# Patient Record
Sex: MALE | Race: BLACK OR AFRICAN AMERICAN | Employment: UNEMPLOYED | URBAN - METROPOLITAN AREA
[De-identification: names, ages, dates, MRNs, and addresses within clinical notes are randomized per-mention and may not be internally consistent; named-entity substitution may affect disease eponyms.]

---

## 2017-04-18 ENCOUNTER — HOSPITAL ENCOUNTER (OUTPATIENT)
Dept: LAB | Age: 82
Discharge: HOME | End: 2017-04-18

## 2017-04-18 PROCEDURE — 99001 SPECIMEN HANDLING PT-LAB: CPT | Performed by: INTERNAL MEDICINE

## 2017-11-22 ENCOUNTER — HOSPITAL ENCOUNTER (OUTPATIENT)
Dept: LAB | Age: 82
Discharge: HOME | End: 2017-11-22

## 2017-11-22 PROCEDURE — 99001 SPECIMEN HANDLING PT-LAB: CPT | Performed by: INTERNAL MEDICINE

## 2018-05-09 ENCOUNTER — HOSPITAL ENCOUNTER (OUTPATIENT)
Dept: LAB | Age: 83
Discharge: HOME | End: 2018-05-09
Payer: MEDICARE

## 2018-05-09 DIAGNOSIS — M17.0 PRIMARY OSTEOARTHRITIS OF BOTH KNEES: ICD-10-CM

## 2018-05-09 DIAGNOSIS — E11.29 TYPE II OR UNSPECIFIED TYPE DIABETES MELLITUS WITH RENAL MANIFESTATIONS, UNCONTROLLED(250.42) (HCC): ICD-10-CM

## 2018-05-09 DIAGNOSIS — E11.65 TYPE II OR UNSPECIFIED TYPE DIABETES MELLITUS WITH RENAL MANIFESTATIONS, UNCONTROLLED(250.42) (HCC): ICD-10-CM

## 2018-05-09 DIAGNOSIS — R97.20 ELEVATED PROSTATE SPECIFIC ANTIGEN (PSA): ICD-10-CM

## 2018-05-09 DIAGNOSIS — I10 HYPERTENSION, ESSENTIAL: ICD-10-CM

## 2018-05-09 DIAGNOSIS — M10.9 GOUT, UNSPECIFIED: ICD-10-CM

## 2018-05-09 DIAGNOSIS — E78.00 PURE HYPERCHOLESTEROLEMIA: ICD-10-CM

## 2018-05-09 LAB
ALBUMIN SERPL-MCNC: 3.8 G/DL (ref 3.4–5)
ALBUMIN/GLOB SERPL: 1 {RATIO} (ref 0.8–1.7)
ALP SERPL-CCNC: 77 U/L (ref 45–117)
ALT SERPL-CCNC: 26 U/L (ref 16–61)
ANION GAP SERPL CALC-SCNC: 5 MMOL/L (ref 3–18)
AST SERPL-CCNC: 14 U/L (ref 15–37)
BASOPHILS # BLD: 0 K/UL (ref 0–0.06)
BASOPHILS NFR BLD: 0 % (ref 0–2)
BILIRUB SERPL-MCNC: 0.4 MG/DL (ref 0.2–1)
BUN SERPL-MCNC: 19 MG/DL (ref 7–18)
BUN/CREAT SERPL: 17 (ref 12–20)
CALCIUM SERPL-MCNC: 8.5 MG/DL (ref 8.5–10.1)
CHLORIDE SERPL-SCNC: 109 MMOL/L (ref 100–108)
CHOLEST SERPL-MCNC: 131 MG/DL
CO2 SERPL-SCNC: 27 MMOL/L (ref 21–32)
CREAT SERPL-MCNC: 1.12 MG/DL (ref 0.6–1.3)
CREAT UR-MCNC: 19.4 MG/DL (ref 30–125)
DIFFERENTIAL METHOD BLD: ABNORMAL
EOSINOPHIL # BLD: 0.1 K/UL (ref 0–0.4)
EOSINOPHIL NFR BLD: 2 % (ref 0–5)
ERYTHROCYTE [DISTWIDTH] IN BLOOD BY AUTOMATED COUNT: 13.7 % (ref 11.6–14.5)
GLOBULIN SER CALC-MCNC: 3.9 G/DL (ref 2–4)
GLUCOSE SERPL-MCNC: 138 MG/DL (ref 74–99)
HBA1C MFR BLD: 6.5 % (ref 4.2–5.6)
HCT VFR BLD AUTO: 36.8 % (ref 36–48)
HDLC SERPL-MCNC: 41 MG/DL (ref 40–60)
HDLC SERPL: 3.2 {RATIO} (ref 0–5)
HGB BLD-MCNC: 12.2 G/DL (ref 13–16)
LDLC SERPL CALC-MCNC: 68.8 MG/DL (ref 0–100)
LIPID PROFILE,FLP: NORMAL
LYMPHOCYTES # BLD: 1.9 K/UL (ref 0.9–3.6)
LYMPHOCYTES NFR BLD: 32 % (ref 21–52)
MCH RBC QN AUTO: 27.2 PG (ref 24–34)
MCHC RBC AUTO-ENTMCNC: 33.2 G/DL (ref 31–37)
MCV RBC AUTO: 82 FL (ref 74–97)
MICROALBUMIN UR-MCNC: <0.5 MG/DL (ref 0–3)
MICROALBUMIN/CREAT UR-RTO: ABNORMAL MG/G (ref 0–30)
MONOCYTES # BLD: 0.3 K/UL (ref 0.05–1.2)
MONOCYTES NFR BLD: 6 % (ref 3–10)
NEUTS SEG # BLD: 3.6 K/UL (ref 1.8–8)
NEUTS SEG NFR BLD: 60 % (ref 40–73)
PLATELET # BLD AUTO: 175 K/UL (ref 135–420)
PMV BLD AUTO: 11.8 FL (ref 9.2–11.8)
POTASSIUM SERPL-SCNC: 3.5 MMOL/L (ref 3.5–5.5)
PROT SERPL-MCNC: 7.7 G/DL (ref 6.4–8.2)
RBC # BLD AUTO: 4.49 M/UL (ref 4.7–5.5)
SODIUM SERPL-SCNC: 141 MMOL/L (ref 136–145)
T4 SERPL-MCNC: 8.5 UG/DL (ref 4.7–13.3)
TRIGL SERPL-MCNC: 106 MG/DL (ref ?–150)
TSH SERPL DL<=0.05 MIU/L-ACNC: 1.41 UIU/ML (ref 0.36–3.74)
VLDLC SERPL CALC-MCNC: 21.2 MG/DL
WBC # BLD AUTO: 6 K/UL (ref 4.6–13.2)

## 2018-05-09 PROCEDURE — 84443 ASSAY THYROID STIM HORMONE: CPT | Performed by: INTERNAL MEDICINE

## 2018-05-09 PROCEDURE — 83036 HEMOGLOBIN GLYCOSYLATED A1C: CPT | Performed by: INTERNAL MEDICINE

## 2018-05-09 PROCEDURE — 84436 ASSAY OF TOTAL THYROXINE: CPT | Performed by: INTERNAL MEDICINE

## 2018-05-09 PROCEDURE — 80053 COMPREHEN METABOLIC PANEL: CPT | Performed by: INTERNAL MEDICINE

## 2018-05-09 PROCEDURE — 80061 LIPID PANEL: CPT | Performed by: INTERNAL MEDICINE

## 2018-05-09 PROCEDURE — 85025 COMPLETE CBC W/AUTO DIFF WBC: CPT | Performed by: INTERNAL MEDICINE

## 2018-05-09 PROCEDURE — 82043 UR ALBUMIN QUANTITATIVE: CPT | Performed by: INTERNAL MEDICINE

## 2018-05-09 PROCEDURE — 36415 COLL VENOUS BLD VENIPUNCTURE: CPT | Performed by: INTERNAL MEDICINE

## 2019-02-14 ENCOUNTER — HOSPITAL ENCOUNTER (INPATIENT)
Age: 84
LOS: 6 days | Discharge: HOME HEALTH | DRG: 696 | End: 2019-02-20
Attending: EMERGENCY MEDICINE | Admitting: INTERNAL MEDICINE
Payer: MEDICARE

## 2019-02-14 DIAGNOSIS — R31.9 HEMATURIA, UNSPECIFIED TYPE: Primary | ICD-10-CM

## 2019-02-14 DIAGNOSIS — R42 LIGHTHEADEDNESS: ICD-10-CM

## 2019-02-14 LAB
ALBUMIN SERPL-MCNC: 4 G/DL (ref 3.4–5)
ALBUMIN/GLOB SERPL: 1 {RATIO} (ref 0.8–1.7)
ALP SERPL-CCNC: 90 U/L (ref 45–117)
ALT SERPL-CCNC: 22 U/L (ref 16–61)
ANION GAP SERPL CALC-SCNC: 9 MMOL/L (ref 3–18)
APPEARANCE UR: ABNORMAL
AST SERPL-CCNC: 18 U/L (ref 15–37)
BACTERIA URNS QL MICRO: ABNORMAL /HPF
BASOPHILS # BLD: 0 K/UL (ref 0–0.06)
BASOPHILS NFR BLD: 0 % (ref 0–3)
BILIRUB SERPL-MCNC: 0.4 MG/DL (ref 0.2–1)
BILIRUB UR QL: ABNORMAL
BUN SERPL-MCNC: 21 MG/DL (ref 7–18)
BUN/CREAT SERPL: 16 (ref 12–20)
CALCIUM SERPL-MCNC: 8.4 MG/DL (ref 8.5–10.1)
CHLORIDE SERPL-SCNC: 103 MMOL/L (ref 100–108)
CK MB CFR SERPL CALC: 2.4 % (ref 0–4)
CK MB SERPL-MCNC: 2.7 NG/ML (ref 5–25)
CK SERPL-CCNC: 111 U/L (ref 39–308)
CO2 SERPL-SCNC: 24 MMOL/L (ref 21–32)
COLOR UR: ABNORMAL
CREAT SERPL-MCNC: 1.35 MG/DL (ref 0.6–1.3)
DIFFERENTIAL METHOD BLD: ABNORMAL
EOSINOPHIL # BLD: 0 K/UL (ref 0–0.4)
EOSINOPHIL NFR BLD: 0 % (ref 0–5)
EPITH CASTS URNS QL MICRO: ABNORMAL /LPF (ref 0–5)
ERYTHROCYTE [DISTWIDTH] IN BLOOD BY AUTOMATED COUNT: 12.7 % (ref 11.6–14.5)
GLOBULIN SER CALC-MCNC: 4 G/DL (ref 2–4)
GLUCOSE BLD STRIP.AUTO-MCNC: 134 MG/DL (ref 70–110)
GLUCOSE SERPL-MCNC: 191 MG/DL (ref 74–99)
GLUCOSE UR STRIP.AUTO-MCNC: 100 MG/DL
HCT VFR BLD AUTO: 35 % (ref 36–48)
HGB BLD-MCNC: 11.5 G/DL (ref 13–16)
HGB UR QL STRIP: ABNORMAL
INR PPP: 1 (ref 0.8–1.2)
KETONES UR QL STRIP.AUTO: 40 MG/DL
LACTATE BLD-SCNC: 1.31 MMOL/L (ref 0.4–2)
LEUKOCYTE ESTERASE UR QL STRIP.AUTO: ABNORMAL
LYMPHOCYTES # BLD: 1.5 K/UL (ref 0.8–3.5)
LYMPHOCYTES NFR BLD: 14 % (ref 20–51)
MCH RBC QN AUTO: 27.6 PG (ref 24–34)
MCHC RBC AUTO-ENTMCNC: 32.9 G/DL (ref 31–37)
MCV RBC AUTO: 84.1 FL (ref 74–97)
MONOCYTES # BLD: 0.4 K/UL (ref 0–1)
MONOCYTES NFR BLD: 4 % (ref 2–9)
NEUTS SEG # BLD: 8.6 K/UL (ref 1.8–8)
NEUTS SEG NFR BLD: 82 % (ref 42–75)
NITRITE UR QL STRIP.AUTO: NEGATIVE
OTHER,OTHU: ABNORMAL
PH UR STRIP: 8.5 [PH] (ref 5–8)
PLATELET # BLD AUTO: 181 K/UL (ref 135–420)
PLATELET COMMENTS,PCOM: ABNORMAL
PMV BLD AUTO: 10.9 FL (ref 9.2–11.8)
POTASSIUM SERPL-SCNC: 4.1 MMOL/L (ref 3.5–5.5)
PROT SERPL-MCNC: 8 G/DL (ref 6.4–8.2)
PROT UR STRIP-MCNC: >300 MG/DL
PROTHROMBIN TIME: 13.2 SEC (ref 11.5–15.2)
RBC # BLD AUTO: 4.16 M/UL (ref 4.7–5.5)
RBC #/AREA URNS HPF: ABNORMAL /HPF (ref 0–5)
RBC MORPH BLD: ABNORMAL
SODIUM SERPL-SCNC: 136 MMOL/L (ref 136–145)
SP GR UR REFRACTOMETRY: 1.01 (ref 1–1.03)
TROPONIN I BLD-MCNC: <0.04 NG/ML (ref 0–0.08)
TROPONIN I SERPL-MCNC: <0.02 NG/ML (ref 0–0.04)
UROBILINOGEN UR QL STRIP.AUTO: 4 EU/DL (ref 0.2–1)
WBC # BLD AUTO: 10.5 K/UL (ref 4.6–13.2)
WBC URNS QL MICRO: ABNORMAL /HPF (ref 0–4)

## 2019-02-14 PROCEDURE — 87086 URINE CULTURE/COLONY COUNT: CPT

## 2019-02-14 PROCEDURE — 96360 HYDRATION IV INFUSION INIT: CPT

## 2019-02-14 PROCEDURE — 85610 PROTHROMBIN TIME: CPT

## 2019-02-14 PROCEDURE — 82550 ASSAY OF CK (CPK): CPT

## 2019-02-14 PROCEDURE — 74011000250 HC RX REV CODE- 250: Performed by: INTERNAL MEDICINE

## 2019-02-14 PROCEDURE — 65660000000 HC RM CCU STEPDOWN

## 2019-02-14 PROCEDURE — 74011250636 HC RX REV CODE- 250/636: Performed by: EMERGENCY MEDICINE

## 2019-02-14 PROCEDURE — 93005 ELECTROCARDIOGRAM TRACING: CPT

## 2019-02-14 PROCEDURE — 99285 EMERGENCY DEPT VISIT HI MDM: CPT

## 2019-02-14 PROCEDURE — 83605 ASSAY OF LACTIC ACID: CPT

## 2019-02-14 PROCEDURE — 84484 ASSAY OF TROPONIN QUANT: CPT

## 2019-02-14 PROCEDURE — 81001 URINALYSIS AUTO W/SCOPE: CPT

## 2019-02-14 PROCEDURE — 74011250636 HC RX REV CODE- 250/636: Performed by: INTERNAL MEDICINE

## 2019-02-14 PROCEDURE — 85025 COMPLETE CBC W/AUTO DIFF WBC: CPT

## 2019-02-14 PROCEDURE — 87040 BLOOD CULTURE FOR BACTERIA: CPT

## 2019-02-14 PROCEDURE — 80053 COMPREHEN METABOLIC PANEL: CPT

## 2019-02-14 PROCEDURE — 82962 GLUCOSE BLOOD TEST: CPT

## 2019-02-14 RX ORDER — HYDRALAZINE HYDROCHLORIDE 20 MG/ML
10 INJECTION INTRAMUSCULAR; INTRAVENOUS
Status: DISCONTINUED | OUTPATIENT
Start: 2019-02-14 | End: 2019-02-20 | Stop reason: HOSPADM

## 2019-02-14 RX ORDER — ONDANSETRON 2 MG/ML
4 INJECTION INTRAMUSCULAR; INTRAVENOUS
Status: DISCONTINUED | OUTPATIENT
Start: 2019-02-14 | End: 2019-02-20 | Stop reason: HOSPADM

## 2019-02-14 RX ORDER — CEPHALEXIN 500 MG/1
500 CAPSULE ORAL 2 TIMES DAILY
Qty: 14 CAP | Refills: 0 | Status: SHIPPED | OUTPATIENT
Start: 2019-02-14 | End: 2019-02-20

## 2019-02-14 RX ORDER — TAMSULOSIN HYDROCHLORIDE 0.4 MG/1
0.4 CAPSULE ORAL DAILY
Status: DISCONTINUED | OUTPATIENT
Start: 2019-02-15 | End: 2019-02-15

## 2019-02-14 RX ORDER — INSULIN LISPRO 100 [IU]/ML
INJECTION, SOLUTION INTRAVENOUS; SUBCUTANEOUS
Status: DISCONTINUED | OUTPATIENT
Start: 2019-02-14 | End: 2019-02-20 | Stop reason: HOSPADM

## 2019-02-14 RX ORDER — DUTASTERIDE 0.5 MG/1
0.5 CAPSULE, LIQUID FILLED ORAL DAILY
Status: DISCONTINUED | OUTPATIENT
Start: 2019-02-15 | End: 2019-02-20 | Stop reason: HOSPADM

## 2019-02-14 RX ORDER — SODIUM CHLORIDE 9 MG/ML
75 INJECTION, SOLUTION INTRAVENOUS CONTINUOUS
Status: DISCONTINUED | OUTPATIENT
Start: 2019-02-14 | End: 2019-02-15

## 2019-02-14 RX ORDER — ACETAMINOPHEN 325 MG/1
650 TABLET ORAL
Status: DISCONTINUED | OUTPATIENT
Start: 2019-02-14 | End: 2019-02-20 | Stop reason: HOSPADM

## 2019-02-14 RX ORDER — DUTASTERIDE 0.5 MG/1
0.5 CAPSULE, LIQUID FILLED ORAL DAILY
Qty: 30 CAP | Refills: 0 | Status: SHIPPED | OUTPATIENT
Start: 2019-02-14 | End: 2019-02-20

## 2019-02-14 RX ORDER — AMLODIPINE BESYLATE 5 MG/1
5 TABLET ORAL DAILY
Status: DISCONTINUED | OUTPATIENT
Start: 2019-02-15 | End: 2019-02-15

## 2019-02-14 RX ORDER — MAGNESIUM SULFATE 100 %
16 CRYSTALS MISCELLANEOUS AS NEEDED
Status: DISCONTINUED | OUTPATIENT
Start: 2019-02-14 | End: 2019-02-20 | Stop reason: HOSPADM

## 2019-02-14 RX ORDER — DEXTROSE 50 % IN WATER (D50W) INTRAVENOUS SYRINGE
25-50 AS NEEDED
Status: DISCONTINUED | OUTPATIENT
Start: 2019-02-14 | End: 2019-02-20 | Stop reason: HOSPADM

## 2019-02-14 RX ADMIN — WATER 1 G: 1 INJECTION INTRAMUSCULAR; INTRAVENOUS; SUBCUTANEOUS at 16:46

## 2019-02-14 RX ADMIN — SODIUM CHLORIDE 1000 ML: 900 INJECTION, SOLUTION INTRAVENOUS at 14:04

## 2019-02-14 RX ADMIN — SODIUM CHLORIDE 75 ML/HR: 900 INJECTION, SOLUTION INTRAVENOUS at 21:23

## 2019-02-14 NOTE — ED TRIAGE NOTES
1135: EMS reports pt sent from PCP for rectal bleeding; approximately 500 ml lost;feeling dizziness; 's; denies abd pain

## 2019-02-14 NOTE — H&P
History and Physical      NAME:  Yunier Balbuena   :   1935   MRN:   [id number]     Date/Time:  2019     Chief Complaint: hematuria       History of Present Illness:        Mr. Vasyl Corona is a 80 y.o.   male with a PMH of HTN, hyperlipidemia, Gout and BPH who presents with c/c of hematuria. Patient has sinai hematuria and went to see his PCP today who send him to ED for evaluation. He continue to have hematuria here in ED. He denies fever or chills. No abdominal or pelvic pain. No nausea or vomiting. Here in ED his H/H was 11.5/35. UA is +ve for UTI, started on antibiotics, urology also is consulted and admitted for further evaluation and management. Past Medical History:   Diagnosis Date    Arthropathy, unspecified, other specified sites     Bone pain     BPH (benign prostatic hypertrophy)     BPH with obstruction/lower urinary tract symptoms     Cough     Fracture     right distal humerus     Gout     H/O seasonal allergies     Hypercholesterolemia     Hypertension     Obesity     Pain with swallowing     Retention of urine, unspecified     Right arm pain     SOB (shortness of breath)     Tattoo     Urinary retention         Past Surgical History:   Procedure Laterality Date    COLONOSCOPY N/A 2016    COLONOSCOPY performed by Kristen Chow MD at [address] HX ORTHOPAEDIC  2010    ORIF, right distal humerus fracture    HX OTHER SURGICAL      CYSTOSCOPY W PHOTOVAPORIZATION POST PROCEDURE ORDERSET 1    FL COLSC FLX W/NDSC US XM RCTM ET AL LMTD&ADJ STRUX         Social History     Tobacco Use    Smoking status: Former Smoker    Smokeless tobacco: Former User   Substance Use Topics    Alcohol use: Yes     Comment: occasionally        History reviewed. No pertinent family history. No Known Allergies     Prior to Admission medications    Medication Sig Start Date End Date Taking?  Authorizing Provider   dutasteride (AVODART) 0.5 mg capsule Take 1 Cap by mouth daily for 30 days. 2/14/19 3/16/19 Yes Candi Montana MD   cephALEXin [facility]) 500 mg capsule Take 1 Cap by mouth two (2) times a day for 7 days. 2/14/19 2/21/19 Yes Candi Montana MD   metoprolol tartrate (LOPRESSOR) 25 mg tablet Take 25 mg by mouth two (2) times a day. Provider, Historical   metFORMIN (GLUCOPHAGE) 500 mg tablet Take  by mouth two (2) times daily (with meals). Provider, Historical   allopurinol (ZYLOPRIM) 100 mg tablet Take 100 mg by mouth daily. Provider, Historical   hydroCHLOROthiazide (HYDRODIURIL) 25 mg tablet Take 25 mg by mouth daily. Provider, Historical   folic acid (FOLVITE) 1 mg tablet Take  by mouth daily. Provider, Historical   finasteride (PROSCAR) 5 mg tablet Take 5 mg by mouth daily. Provider, Historical   docusate sodium (COLACE) 100 mg capsule Take 100 mg by mouth two (2) times a day. Provider, Historical   ciprofloxacin (CIPRO) 500 mg tablet Take  by mouth two (2) times a day. Provider, Historical   amLODIPine (NORVASC) 5 mg tablet Take 5 mg by mouth daily. Provider, Historical   colchicine 0.6 mg tablet Take 0.6 mg by mouth daily. Provider, Historical   gabapentin (NEURONTIN) 100 mg capsule Take  by mouth three (3) times daily. Provider, Historical   naproxen (NAPROSYN) 500 mg tablet Take 500 mg by mouth two (2) times daily (with meals). Provider, Historical   simvastatin (ZOCOR) 20 mg tablet Take  by mouth nightly. Provider, Historical   valsartan-hydrochlorothiazide (DIOVAN HCT) 160-12.5 mg per tablet Take 1 Tab by mouth daily. Provider, Historical   fish oil-dha-epa (FISH OIL) 1,200-144-216 mg Cap Take  by mouth. Provider, Historical   tamsulosin (FLOMAX) 0.4 mg capsule Take 0.4 mg by mouth daily. Provider, Historical   Magnesium Oxide 500 mg Cap Take  by mouth. Provider, Historical   risperidone (RISPERDAL) 3 mg tablet Take  by mouth.     Provider, Historical oxycodone-acetaminophen (TYLOX) 5-500 mg per capsule Take 1 Cap by mouth every four (4) hours as needed. Provider, Historical   hydrocodone-acetaminophen (VICODIN ES) 7.5-750 mg per tablet Take  by mouth every six (6) hours as needed.     Provider, Historical          Review of systems:     CONSTITUTIONAL: No Fever, No chills, No weight loss, No Night sweats  HEENT:  No epistaxis, No diff in swallowing  CVS: No chest pain, No palpitations, No syncope, No peripheral edema, No PND, No orthopnea  RS: No shortness of breath, No cough, No hemoptysis, No pleuritic chest pain  GI: No abd pain, No vomitting, No diarrhea, No hematemesis, No rectal bleeding, No acid reflux or heartburn  NEURO: No focal weakness, No headaches, No seizures  PSYCH: No anxiety, No depression  MUSCULOSKLETAL: No joint pain or swelling  : No dysuria  SKIN: No rash      Physical Exam:       VITALS:    Vital signs reviewed; most recent are:    Visit Vitals  /80   Pulse 69   Resp 27   SpO2 95%     SpO2 Readings from Last 6 Encounters:   02/14/19 95%   12/02/16 96%   08/12/15 96%            Intake/Output Summary (Last 24 hours) at 2/14/2019 1759  Last data filed at 2/14/2019 1504  Gross per 24 hour   Intake 2000 ml   Output --   Net 2000 ml        GENERAL: Not in acute distress  HEENT: pink conjunctiva, un icteric sclera,   NECK: No lymphadenopthy or thyroid swelling, JVD not seen  LYMPH: No supraclavicular or cervical or axillary nodes on both sides  CVS: S1S2, No murmurs, No gallop or rub  RS: CTA, No wheezing or crackles  Abd: Soft, non tender, not distended, No guarding, No rigidity  NEURO:  No focal neurologic deficits   Extrm: no leg edema or swelling   Skin: No rash      Labs:     Recent Results (from the past 24 hour(s))   CBC WITH AUTOMATED DIFF    Collection Time: 02/14/19 11:45 AM   Result Value Ref Range    WBC 10.5 4.6 - 13.2 K/uL    RBC 4.16 (L) 4.70 - 5.50 M/uL    HGB 11.5 (L) 13.0 - 16.0 g/dL    HCT 35.0 (L) 36.0 - 48.0 % MCV 84.1 74.0 - 97.0 FL    MCH 27.6 24.0 - 34.0 PG    MCHC 32.9 31.0 - 37.0 g/dL    RDW 12.7 11.6 - 14.5 %    PLATELET 373 948 - 960 K/uL    MPV 10.9 9.2 - 11.8 FL    NEUTROPHILS 82 (H) 42 - 75 %    LYMPHOCYTES 14 (L) 20 - 51 %    MONOCYTES 4 2 - 9 %    EOSINOPHILS 0 0 - 5 %    BASOPHILS 0 0 - 3 %    ABS. NEUTROPHILS 8.6 (H) 1.8 - 8.0 K/UL    ABS. LYMPHOCYTES 1.5 0.8 - 3.5 K/UL    ABS. MONOCYTES 0.4 0 - 1.0 K/UL    ABS. EOSINOPHILS 0.0 0.0 - 0.4 K/UL    ABS. BASOPHILS 0.0 0.0 - 0.06 K/UL    DF MANUAL      PLATELET COMMENTS ADEQUATE PLATELETS      RBC COMMENTS NORMOCYTIC, NORMOCHROMIC     METABOLIC PANEL, COMPREHENSIVE    Collection Time: 02/14/19 11:45 AM   Result Value Ref Range    Sodium 136 136 - 145 mmol/L    Potassium 4.1 3.5 - 5.5 mmol/L    Chloride 103 100 - 108 mmol/L    CO2 24 21 - 32 mmol/L    Anion gap 9 3.0 - 18 mmol/L    Glucose 191 (H) 74 - 99 mg/dL    BUN 21 (H) 7.0 - 18 MG/DL    Creatinine 1.35 (H) 0.6 - 1.3 MG/DL    BUN/Creatinine ratio 16 12 - 20      GFR est AA >60 >60 ml/min/1.73m2    GFR est non-AA 50 (L) >60 ml/min/1.73m2    Calcium 8.4 (L) 8.5 - 10.1 MG/DL    Bilirubin, total 0.4 0.2 - 1.0 MG/DL    ALT (SGPT) 22 16 - 61 U/L    AST (SGOT) 18 15 - 37 U/L    Alk.  phosphatase 90 45 - 117 U/L    Protein, total 8.0 6.4 - 8.2 g/dL    Albumin 4.0 3.4 - 5.0 g/dL    Globulin 4.0 2.0 - 4.0 g/dL    A-G Ratio 1.0 0.8 - 1.7     PROTHROMBIN TIME + INR    Collection Time: 02/14/19 11:45 AM   Result Value Ref Range    Prothrombin time 13.2 11.5 - 15.2 sec    INR 1.0 0.8 - 1.2     URINALYSIS W/ RFLX MICROSCOPIC    Collection Time: 02/14/19 12:36 PM   Result Value Ref Range    Color RED      Appearance BLOODY      Specific gravity 1.010 1.003 - 1.030      pH (UA) 8.5 (H) 5.0 - 8.0      Protein >300 (A) NEG mg/dL    Glucose 100 (A) NEG mg/dL    Ketone 40 (A) NEG mg/dL    Bilirubin LARGE (A) NEG      Blood LARGE (A) NEG      Urobilinogen 4.0 (H) 0.2 - 1.0 EU/dL    Nitrites NEGATIVE  NEG      Leukocyte Esterase LARGE (A) NEG     URINE MICROSCOPIC ONLY    Collection Time: 02/14/19 12:36 PM   Result Value Ref Range    WBC  0 - 4 /hpf     UNABLE TO QUANTITATE MICROSCOPIC PARAMETERS DUE TO EXCESSIVE RBCS    RBC TOO NUMEROUS TO COUNT 0 - 5 /hpf    Epithelial cells  0 - 5 /lpf     UNABLE TO QUANTITATE MICROSCOPIC PARAMETERS DUE TO EXCESSIVE RBCS    Bacteria (A) NEG /hpf     UNABLE TO QUANTITATE MICROSCOPIC PARAMETERS DUE TO EXCESSIVE RBCS    Other:        Macroscopic performed on spun urine due to gross blood  or mucus   EKG, 12 LEAD, INITIAL    Collection Time: 02/14/19  1:33 PM   Result Value Ref Range    Ventricular Rate 70 BPM    Atrial Rate 70 BPM    P-R Interval 154 ms    QRS Duration 82 ms    Q-T Interval 414 ms    QTC Calculation (Bezet) 447 ms    Calculated P Axis 31 degrees    Calculated T Axis 61 degrees    Diagnosis       Normal sinus rhythm  Nonspecific ST abnormality  Abnormal ECG  When compared with ECG of 30-DEC-2010 09:04,  QT has lengthened     CARDIAC PANEL,(CK, CKMB & TROPONIN)    Collection Time: 02/14/19  2:15 PM   Result Value Ref Range     39 - 308 U/L    CK - MB 2.7 <3.6 ng/ml    CK-MB Index 2.4 0.0 - 4.0 %    Troponin-I, QT <0.02 0.0 - 0.045 NG/ML   POC TROPONIN-I    Collection Time: 02/14/19  3:54 PM   Result Value Ref Range    Troponin-I (POC) <0.04 0.00 - 0.08 ng/mL   POC LACTIC ACID    Collection Time: 02/14/19  4:31 PM   Result Value Ref Range    Lactic Acid (POC) 1.31 0.40 - 2.00 mmol/L         Active Problems:    Hematuria (2/14/2019)      Episodic lightheadedness (2/14/2019)        Assessment:       1. Hematuria  2. UTI  3. HTN,   4. Hyperlipidemia,   5. Gout   6.  BPH    Plan:       · Patient being admitted to medical floor  · Urology already consulted per ED  · Will send Urine culture  · Start on IV ceftriaxone  · Will do bladder scan, if retaining may need manning cath  · Resume home meds  · Full code  · DVT prophylaxsis  · D/w patient and his wife at bedside    Total time:  62 minutes        _______________________________________________________________________        Attending Physician: Tian Chang MD       Copies:  Robyn Inman MD

## 2019-02-14 NOTE — ED PROVIDER NOTES
EMERGENCY DEPARTMENT HISTORY AND PHYSICAL EXAM    11:39 AM  Date: 2/14/2019  Patient Name: Kimberly Ramirez    History of Presenting Illness     Chief Complaint: No chief complaint on file. Duration: Since last night  Timing:  Constant  Location: GI  Severity: Severe  Modifying Factors: None reported. Associated Symptoms: None. History Provided By: Patient and EMS    Additional History (Context): Kimberly Ramirez is a 80 y.o. male with a PMHx of HTn who presents [facility] the ED [facility]. Pt went to his PCP this morning for an onset of severe, constant GI bleeding that started last night, his PCP sent him to the ED. He denies any pain or other sx. Upon arrival in the ED, pt has blood present down his pant legs. He has no known drug allergies. Former smoker, uses etoh occasionally but no drug use. Denies any fever, chills, CP, SOB, abdominal pain, nausea, vomiting, diarrhea, urinary sx and any associated signs or sx. No further concerns or complaints at this time. 12:39 PM: Addendum: Pt has sinai hematuria upon providing urine sample. Upon second conversation with pt, he has had no rectal bleeding, it ha all been hematuria. Miscommunication via EMS. PCP: Gertrudis Lock MD    This was created with voice recognition software and transcription errors may be present.        Past History     Past Medical History:  Past Medical History:   Diagnosis Date    Arthropathy, unspecified, other specified sites     Bone pain     BPH (benign prostatic hypertrophy)     BPH with obstruction/lower urinary tract symptoms     Cough     Fracture     right distal humerus     Gout     H/O seasonal allergies     Hypercholesterolemia     Hypertension     Obesity     Pain with swallowing     Retention of urine, unspecified     Right arm pain     SOB (shortness of breath)     Tattoo     Urinary retention        Past Surgical History:  Past Surgical History:   Procedure Laterality Date    COLONOSCOPY N/A 12/2/2016    COLONOSCOPY performed by Jacki Reddy MD at [address] HX ORTHOPAEDIC  12-    ORIF, right distal humerus fracture    HX OTHER SURGICAL      CYSTOSCOPY W PHOTOVAPORIZATION POST PROCEDURE ORDERSET 1    ID COLSC FLX W/NDSC US XM RCTM ET AL LMTD&ADJ [facility]         Family History:  No family history on file. Social History:  Social History     Tobacco Use    Smoking status: Former Smoker    Smokeless tobacco: Never Used   Substance Use Topics    Alcohol use: Yes     Comment: occasionally    Drug use: No       Allergies:  No Known Allergies    Review of Systems       Review of Systems   Constitutional: Negative for chills and fever. Gastrointestinal: Positive for anal bleeding and blood in stool. Negative for abdominal pain. Neurological: Negative for weakness and light-headedness. All other systems reviewed and are negative. Physical Exam       Physical Exam   Constitutional: He is oriented to person, place, and time. He appears well-developed. HENT:   Head: Normocephalic and atraumatic. Eyes: EOM are normal. Pupils are equal, round, and reactive to light. Neck: Normal range of motion. Neck supple. Cardiovascular: Normal rate, regular rhythm and normal heart sounds. Exam reveals no friction rub. No murmur heard. Pulmonary/Chest: Effort normal and breath sounds normal. No respiratory distress. He has no wheezes. Abdominal: Soft. He exhibits no distension. There is no tenderness. There is no rebound and no guarding. Musculoskeletal: Normal range of motion. Neurological: He is alert and oriented to person, place, and time. Skin: Skin is warm and dry. Psychiatric: He has a normal mood and affect. His behavior is normal. Thought content normal.       Diagnostic Study Results     Vital Signs  Visit Vitals  /76 (BP 1 Location: Right arm, BP Patient Position: At rest;Supine)   Pulse 73   Resp 25   SpO2 96%     Orthostatics neg    EKG: nsr at 70; nl axis. Nl int. No juventino/d. No hypertrophy. Labs:   H&H 11/35; plt 181  UA gross blood + LE      Medical Decision Making     ED Course: Progress Notes, Reevaluation, and Consults:    2:38 PM: I reevaluated the pt, he is feeling much better. Provider Notes (Medical Decision Making):   A 22-year-old gentleman presents with lightheadedness as well as hematuria. Not on any anticoagulation. No fevers or chills but likely secondary to infection. Discussed with family this also may be secondary to cancer if the urine is not clear . If they are discharged and will follow up with urology. Has follow up with Urology dr Hall Lung much better. Dw/ uro  Recommend keflex and avodart. Family is very upset pt is still bleeding. Requesting an additional provider to see him. I attempted to explain my decision making but was unsuccessful. Dw/ dr Stewart Mercer. Will obs  Sent cx; tx ceftriaxone      Diagnosis     Clinical Impression: No diagnosis found.     Disposition:       Medication List      ASK your doctor about these medications    allopurinol 100 mg tablet  Commonly known as:  ZYLOPRIM     CIPRO 500 mg tablet  Generic drug:  ciprofloxacin HCl     COLACE 100 mg capsule  Generic drug:  docusate sodium     colchicine 0.6 mg tablet     DIOVAN -12.5 mg per tablet  Generic drug:  valsartan-hydroCHLOROthiazide     FISH OIL 1,200-144-216 mg Cap  Generic drug:  fish oil-dha-epa     FLOMAX 0.4 mg capsule  Generic drug:  tamsulosin     folic acid 1 mg tablet  Commonly known as:  FOLVITE     gabapentin 100 mg capsule  Commonly known as:  NEURONTIN     hydroCHLOROthiazide 25 mg tablet  Commonly known as:  HYDRODIURIL     Magnesium Oxide 500 mg Cap     metFORMIN 500 mg tablet  Commonly known as:  GLUCOPHAGE     metoprolol tartrate 25 mg tablet  Commonly known as:  LOPRESSOR     naproxen 500 mg tablet  Commonly known as:  NAPROSYN     NORVASC 5 mg tablet  Generic drug:  amLODIPine     PROSCAR 5 mg tablet  Generic drug: finasteride     RisperDAL 3 mg tablet  Generic drug:  risperiDONE     TYLOX 5-500 mg per capsule  Generic drug:  oxyCODONE-acetaminophen     VICODIN ES 7.5-750 mg per tablet  Generic drug:  HYDROcodone-acetaminophen     ZOCOR 20 mg tablet  Generic drug:  simvastatin          _______________________________    Attestations:  Scribe Attestation      Vanna Coffey acting as a scribe for and in the presence of Radha Diaz MD      February 14, 2019 at 11:39 AM       Provider Attestation:      I personally performed the services described in the documentation, reviewed the documentation, as recorded by the scribe in my presence, and it accurately and completely records my words and actions.  February 14, 2019 at 11:39 AM - Radha Diaz MD    _______________________________

## 2019-02-14 NOTE — PROGRESS NOTES
conducted an initial consultation and Spiritual Assessment for Guru Li, who is a 80 y. o.,male. Patients Primary Language is: [place]. According to the patients EMR Pentecostal Affiliation is: [place]. The reason the Patient came to the hospital is:   Patient Active Problem List    Diagnosis Date Noted    Hematuria 02/14/2019    Episodic lightheadedness 02/14/2019    Retention of urine, unspecified 10/01/2012    BPH with obstruction/lower urinary tract symptoms 10/01/2012    H/O seasonal allergies 10/01/2012    Urinary retention 10/01/2012    Arthropathy, unspecified, other specified sites 10/01/2012    Tattoo 10/01/2012    Pain with swallowing 10/01/2012    SOB (shortness of breath) 10/01/2012    Cough 10/01/2012    Bone pain 10/01/2012    Seizures ([address] 10/01/2012    Right arm pain     Hypertension     Hypercholesterolemia         The  provided the following Interventions:  Initiated a relationship of care and support. Listened empathically. Provided chaplaincy education. Provided information about Spiritual Care Services. Offered prayer and assurance of continued prayers on patient's behalf. Chart reviewed. The following outcomes where achieved:  Patient shared limited information about both their medical narrative and spiritual journey/beliefs. Patient expressed gratitude for 's visit. Assessment:  Patient does not have any Pentecostalism/cultural needs that will affect patients preferences in health care. There are no spiritual or Pentecostalism issues which require intervention at this time. Plan:  Chaplains will continue to follow and will provide pastoral care on an as needed/requested basis.  recommends bedside caregivers page  on duty if patient shows signs of acute spiritual or emotional distress.     Chaplain Resident [facility]   [phone]

## 2019-02-14 NOTE — ED NOTES
Pt reports no rectal bleeding only bleeding from penis.  Sent urine specimen to lab; informed Dr Nikole Sweet

## 2019-02-15 ENCOUNTER — APPOINTMENT (OUTPATIENT)
Dept: CT IMAGING | Age: 84
DRG: 696 | End: 2019-02-15
Attending: UROLOGY
Payer: MEDICARE

## 2019-02-15 LAB
ANION GAP SERPL CALC-SCNC: 6 MMOL/L (ref 3–18)
ATRIAL RATE: 70 BPM
BACTERIA SPEC CULT: NORMAL
BASOPHILS # BLD: 0 K/UL (ref 0–0.06)
BASOPHILS NFR BLD: 0 % (ref 0–3)
BUN SERPL-MCNC: 12 MG/DL (ref 7–18)
BUN/CREAT SERPL: 13 (ref 12–20)
CALCIUM SERPL-MCNC: 8 MG/DL (ref 8.5–10.1)
CALCULATED P AXIS, ECG09: 31 DEGREES
CALCULATED T AXIS, ECG11: 61 DEGREES
CHLORIDE SERPL-SCNC: 112 MMOL/L (ref 100–108)
CO2 SERPL-SCNC: 24 MMOL/L (ref 21–32)
CREAT SERPL-MCNC: 0.93 MG/DL (ref 0.6–1.3)
DIAGNOSIS, 93000: NORMAL
DIFFERENTIAL METHOD BLD: ABNORMAL
EOSINOPHIL # BLD: 0.2 K/UL (ref 0–0.4)
EOSINOPHIL NFR BLD: 4 % (ref 0–5)
ERYTHROCYTE [DISTWIDTH] IN BLOOD BY AUTOMATED COUNT: 12.9 % (ref 11.6–14.5)
EST. AVERAGE GLUCOSE BLD GHB EST-MCNC: 148 MG/DL
GLUCOSE BLD STRIP.AUTO-MCNC: 156 MG/DL (ref 70–110)
GLUCOSE BLD STRIP.AUTO-MCNC: 179 MG/DL (ref 70–110)
GLUCOSE BLD STRIP.AUTO-MCNC: 237 MG/DL (ref 70–110)
GLUCOSE SERPL-MCNC: 124 MG/DL (ref 74–99)
HBA1C MFR BLD: 6.8 % (ref 4.2–5.6)
HCT VFR BLD AUTO: 24.3 % (ref 36–48)
HCT VFR BLD AUTO: 28.7 % (ref 36–48)
HCT VFR BLD AUTO: 34.1 % (ref 36–48)
HGB BLD-MCNC: 10.8 G/DL (ref 13–16)
HGB BLD-MCNC: 8 G/DL (ref 13–16)
HGB BLD-MCNC: 9.3 G/DL (ref 13–16)
LYMPHOCYTES # BLD: 1.1 K/UL (ref 0.8–3.5)
LYMPHOCYTES NFR BLD: 21 % (ref 20–51)
MCH RBC QN AUTO: 27.3 PG (ref 24–34)
MCHC RBC AUTO-ENTMCNC: 32.4 G/DL (ref 31–37)
MCV RBC AUTO: 84.2 FL (ref 74–97)
MONOCYTES # BLD: 0.2 K/UL (ref 0–1)
MONOCYTES NFR BLD: 4 % (ref 2–9)
NEUTS SEG # BLD: 3.9 K/UL (ref 1.8–8)
NEUTS SEG NFR BLD: 71 % (ref 42–75)
P-R INTERVAL, ECG05: 154 MS
PLATELET # BLD AUTO: 161 K/UL (ref 135–420)
PLATELET COMMENTS,PCOM: ABNORMAL
PMV BLD AUTO: 11.2 FL (ref 9.2–11.8)
POTASSIUM SERPL-SCNC: 3.4 MMOL/L (ref 3.5–5.5)
Q-T INTERVAL, ECG07: 414 MS
QRS DURATION, ECG06: 82 MS
QTC CALCULATION (BEZET), ECG08: 447 MS
RBC # BLD AUTO: 3.41 M/UL (ref 4.7–5.5)
RBC MORPH BLD: ABNORMAL
SERVICE CMNT-IMP: NORMAL
SODIUM SERPL-SCNC: 142 MMOL/L (ref 136–145)
VENTRICULAR RATE, ECG03: 70 BPM
WBC # BLD AUTO: 5.4 K/UL (ref 4.6–13.2)

## 2019-02-15 PROCEDURE — 0TJB8ZZ INSPECTION OF BLADDER, VIA NATURAL OR ARTIFICIAL OPENING ENDOSCOPIC: ICD-10-PCS | Performed by: UROLOGY

## 2019-02-15 PROCEDURE — 85018 HEMOGLOBIN: CPT

## 2019-02-15 PROCEDURE — 74011636637 HC RX REV CODE- 636/637: Performed by: INTERNAL MEDICINE

## 2019-02-15 PROCEDURE — 36415 COLL VENOUS BLD VENIPUNCTURE: CPT

## 2019-02-15 PROCEDURE — 74011000250 HC RX REV CODE- 250: Performed by: INTERNAL MEDICINE

## 2019-02-15 PROCEDURE — 80048 BASIC METABOLIC PNL TOTAL CA: CPT

## 2019-02-15 PROCEDURE — 51798 US URINE CAPACITY MEASURE: CPT

## 2019-02-15 PROCEDURE — 74011250637 HC RX REV CODE- 250/637: Performed by: EMERGENCY MEDICINE

## 2019-02-15 PROCEDURE — 74011250637 HC RX REV CODE- 250/637: Performed by: NURSE PRACTITIONER

## 2019-02-15 PROCEDURE — 74011250637 HC RX REV CODE- 250/637: Performed by: INTERNAL MEDICINE

## 2019-02-15 PROCEDURE — 82962 GLUCOSE BLOOD TEST: CPT

## 2019-02-15 PROCEDURE — 77030005538 HC CATH URETH FOL44 BARD -B

## 2019-02-15 PROCEDURE — 83036 HEMOGLOBIN GLYCOSYLATED A1C: CPT

## 2019-02-15 PROCEDURE — 85025 COMPLETE CBC W/AUTO DIFF WBC: CPT

## 2019-02-15 PROCEDURE — 74011250636 HC RX REV CODE- 250/636: Performed by: INTERNAL MEDICINE

## 2019-02-15 PROCEDURE — 74011250636 HC RX REV CODE- 250/636: Performed by: NURSE PRACTITIONER

## 2019-02-15 PROCEDURE — 65660000000 HC RM CCU STEPDOWN

## 2019-02-15 RX ORDER — VANCOMYCIN HYDROCHLORIDE
1250
Status: DISCONTINUED | OUTPATIENT
Start: 2019-02-16 | End: 2019-02-18

## 2019-02-15 RX ORDER — MORPHINE SULFATE 4 MG/ML
1 INJECTION INTRAVENOUS ONCE
Status: COMPLETED | OUTPATIENT
Start: 2019-02-15 | End: 2019-02-15

## 2019-02-15 RX ORDER — POTASSIUM CHLORIDE 20 MEQ/1
40 TABLET, EXTENDED RELEASE ORAL
Status: COMPLETED | OUTPATIENT
Start: 2019-02-15 | End: 2019-02-15

## 2019-02-15 RX ORDER — VANCOMYCIN 1.75 GRAM/500 ML IN 0.9 % SODIUM CHLORIDE INTRAVENOUS
1750 ONCE
Status: COMPLETED | OUTPATIENT
Start: 2019-02-16 | End: 2019-02-16

## 2019-02-15 RX ORDER — ALBUMIN HUMAN 250 G/1000ML
12.5 SOLUTION INTRAVENOUS ONCE
Status: COMPLETED | OUTPATIENT
Start: 2019-02-15 | End: 2019-02-16

## 2019-02-15 RX ORDER — SODIUM CHLORIDE 9 MG/ML
100 INJECTION, SOLUTION INTRAVENOUS CONTINUOUS
Status: DISCONTINUED | OUTPATIENT
Start: 2019-02-15 | End: 2019-02-18

## 2019-02-15 RX ADMIN — ACETAMINOPHEN 650 MG: 325 TABLET ORAL at 15:20

## 2019-02-15 RX ADMIN — DUTASTERIDE 0.5 MG: 0.5 CAPSULE, LIQUID FILLED ORAL at 11:31

## 2019-02-15 RX ADMIN — AMLODIPINE BESYLATE 5 MG: 5 TABLET ORAL at 11:31

## 2019-02-15 RX ADMIN — WATER 1 G: 1 INJECTION INTRAMUSCULAR; INTRAVENOUS; SUBCUTANEOUS at 15:20

## 2019-02-15 RX ADMIN — INSULIN LISPRO 4 UNITS: 100 INJECTION, SOLUTION INTRAVENOUS; SUBCUTANEOUS at 11:33

## 2019-02-15 RX ADMIN — POTASSIUM CHLORIDE 40 MEQ: 20 TABLET, EXTENDED RELEASE ORAL at 11:31

## 2019-02-15 RX ADMIN — SODIUM CHLORIDE 75 ML/HR: 900 INJECTION, SOLUTION INTRAVENOUS at 06:53

## 2019-02-15 RX ADMIN — MORPHINE SULFATE 1 MG: 4 INJECTION INTRAVENOUS at 18:22

## 2019-02-15 RX ADMIN — INSULIN LISPRO 2 UNITS: 100 INJECTION, SOLUTION INTRAVENOUS; SUBCUTANEOUS at 18:30

## 2019-02-15 RX ADMIN — TAMSULOSIN HYDROCHLORIDE 0.4 MG: 0.4 CAPSULE ORAL at 11:31

## 2019-02-15 NOTE — CONSULTS
1. Hematuria, unspecified type    2. Lightheadedness        ASSESSMENT:   Hematuria  Urgency  ? UTI      PLAN:    Ct urogram ordered  Urine culture pending  On rocephin switch to orals when culture returns   Will need cysto as an OP  Check PVR if > 300 cc will plan for manning placement        Alyssa Alex MD    [phone]        Chief Complaint   Patient presents with    Blood in Urine    Dizziness       HISTORY OF PRESENT ILLNESS:  Monty Pillai is a 80 y.o. male who is seen in consultation as referred by Antony Bell  Pt states he started having urgency and spontaneous voids. He feels he empties his bladder completely. He denies previous hx of hematuria  He states two months ago he was doing well without any of these sx. He denies flank pain   In the records he has documented PVP but I do not see an op note in the chart         No flowsheet data found.       Past Medical History:   Diagnosis Date    Arthropathy, unspecified, other specified sites     Bone pain     BPH (benign prostatic hypertrophy)     BPH with obstruction/lower urinary tract symptoms     Cough     Fracture     right distal humerus     Gout     H/O seasonal allergies     Hypercholesterolemia     Hypertension     Obesity     Pain with swallowing     Retention of urine, unspecified     Right arm pain     SOB (shortness of breath)     Tattoo     Urinary retention        Past Surgical History:   Procedure Laterality Date    COLONOSCOPY N/A 12/2/2016    COLONOSCOPY performed by Blayne Hudson MD at [address] [place] [address] HX ORTHOPAEDIC  12-    ORIF, right distal humerus fracture    HX OTHER SURGICAL      CYSTOSCOPY W PHOTOVAPORIZATION POST PROCEDURE ORDERSET 1    RI COLSC FLX W/NDSC US XM RCTM ET AL LMTD&ADJ STRUX         Social History     Tobacco Use    Smoking status: Former Smoker    Smokeless tobacco: Former User   Substance Use Topics    Alcohol use: Yes     Comment: occasionally    Drug use: No       No Known Allergies    History reviewed. No pertinent family history. Current Facility-Administered Medications   Medication Dose Route Frequency Provider Last Rate Last Dose    . PLEASE ENTER THE PATIENT'S HEIGHT AND WEIGHT IN [facility]  1 Each Other ONCE Sarah Fischer MD        morphine injection 1 mg  1 mg IntraVENous ONCE Hang Kimbrough NP        dutasteride (AVODART) capsule 0.5 mg  0.5 mg Oral DAILY Pricilla Kelley MD   0.5 mg at 02/15/19 1131    amLODIPine (NORVASC) tablet 5 mg  5 mg Oral DAILY Rosa Murillo MD   5 mg at 02/15/19 1131    tamsulosin (FLOMAX) capsule 0.4 mg  0.4 mg Oral DAILY Rosa Murillo MD   0.4 mg at 02/15/19 1131    0.9% sodium chloride infusion  75 mL/hr IntraVENous CONTINUOUS Rosa Murillo MD 75 mL/hr at 02/15/19 0653 75 mL/hr at 02/15/19 0653    acetaminophen (TYLENOL) tablet 650 mg  650 mg Oral Q4H PRN Rosa Murillo MD   650 mg at 02/15/19 1520    ondansetron (ZOFRAN) injection 4 mg  4 mg IntraVENous Q4H PRN Rosa Murillo MD        insulin lispro (HUMALOG) injection   SubCUTAneous AC&HS Rosa Murillo MD   4 Units at 02/15/19 1133    glucose chewable tablet 16 g  16 g Oral PRN Rosa Murillo MD        glucagon (GLUCAGEN) injection 1 mg  1 mg IntraMUSCular PRN Rosa Murillo MD        dextrose (D50W) injection syrg 12.5-25 g  25-50 mL IntraVENous PRN Rosa Murillo MD        hydrALAZINE (APRESOLINE) 20 mg/mL injection 10 mg  10 mg IntraVENous Q6H PRN Rosa Murillo MD        cefTRIAXone (ROCEPHIN) 1 g in sterile water (preservative free) 10 mL IV syringe  1 g IntraVENous Q24H Rosa Murillo MD   1 g at 02/15/19 1520       Review of Systems  ROS is:  Unchanged from H & P 2/14          PHYSICAL EXAMINATION:   Visit Vitals  /78 (BP 1 Location: Right arm, BP Patient Position: At rest)   Pulse 76   Temp 97.9 °F (36.6 °C)   Resp 20   SpO2 95%     Constitutional: Well developed, well nourished male. No acute distress.     HEENT: Normocephalic, Atraumatic, EOM's intact   CV:  RRR   Respiratory: No respiratory distress or difficulties breathing   Abdomen:  No abdominal masses or tenderness.  Male:  No CVA tenderness  THEE:Perineum normal to visual inspection, no erythema or irritation, Sphincter with good tone, Rectum with no hemorrhoids, fissures or masses, Prostate smooth, symmetric and anodular. Prostate is moderate. SCROTUM:  No scrotal rash or lesions noticed. Normal bilateral testes and epididymis. PENIS: Urethral meatus normal in location and size. No urethral discharge. unCircumsized   Skin: No evidence of jaundice. Normal color  Neuro/Psych:  Alert and oriented. Affect appropriate. Lymphatic:   No enlarged inguinal lymph nodes. REVIEW OF LABS AND IMAGING:      Labs: Results:   Chemistry    Recent Labs     02/15/19  0330 02/14/19  1145   * 191*    136   K 3.4* 4.1   * 103   CO2 24 24   BUN 12 21*   CREA 0.93 1.35*   CA 8.0* 8.4*   AGAP 6 9   BUCR 13 16   AP  --  90   TP  --  8.0   ALB  --  4.0   GLOB  --  4.0   AGRAT  --  1.0      CBC w/Diff Recent Labs     02/15/19  1239 02/15/19  0330 02/14/19  1145   WBC  --  5.4 10.5   RBC  --  3.41* 4.16*   HGB 10.8* 9.3* 11.5*   HCT 34.1* 28.7* 35.0*   PLT  --  161 181   GRANS  --  71 82*   LYMPH  --  21 14*   EOS  --  4 0      Cultures Recent Labs     02/14/19 2248 02/14/19  1630 02/14/19  1615 02/14/19  1236   CULT NO GROWTH AFTER 13 HOURS NO GROWTH AFTER 14 HOURS NO GROWTH AFTER 14 HOURS <10,000  COLONIES/mL  MIXED GRAM POSITIVE MADHU, PROBABLE SKIN/GENITAL CONTAMINATION.        All Micro Results     Procedure Component Value Units Date/Time    CULTURE, URINE [[id number]] Collected:  02/14/19 2248    Order Status:  Completed Specimen:  Urine from Clean catch Updated:  02/15/19 1323     Special Requests: NO SPECIAL REQUESTS        Culture result: NO GROWTH AFTER 13 HOURS       CULTURE, URINE [[id number]] Collected:  02/14/19 1236    Order Status:  Completed Specimen:  Clean catch Updated:  02/15/19 1230     Special Requests: NO SPECIAL REQUESTS        Culture result:       <10,000  COLONIES/mL  MIXED GRAM POSITIVE MADHU, PROBABLE SKIN/GENITAL CONTAMINATION. CULTURE, BLOOD [[id number]] Collected:  02/14/19 1630    Order Status:  Completed Specimen:  Whole Blood Updated:  02/15/19 0813     Special Requests: NO SPECIAL REQUESTS        Culture result: NO GROWTH AFTER 14 HOURS       CULTURE, BLOOD [[id number]] Collected:  02/14/19 1615    Order Status:  Completed Specimen:  Whole Blood Updated:  02/15/19 0813     Special Requests: NO SPECIAL REQUESTS        Culture result: NO GROWTH AFTER 14 HOURS               Urinalysis Color   Date Value Ref Range Status   02/14/2019 RED   Final     Appearance   Date Value Ref Range Status   02/14/2019 BLOODY   Final     Specific gravity   Date Value Ref Range Status   02/14/2019 1.010 1.003 - 1.030   Final     pH (UA)   Date Value Ref Range Status   02/14/2019 8.5 (H) 5.0 - 8.0   Final     Protein   Date Value Ref Range Status   02/14/2019 >300 (A) NEG mg/dL Final     Ketone   Date Value Ref Range Status   02/14/2019 40 (A) NEG mg/dL Final     Bilirubin   Date Value Ref Range Status   02/14/2019 LARGE (A) NEG   Final     Blood   Date Value Ref Range Status   02/14/2019 LARGE (A) NEG   Final     Urobilinogen   Date Value Ref Range Status   02/14/2019 4.0 (H) 0.2 - 1.0 EU/dL Final     Nitrites   Date Value Ref Range Status   02/14/2019 NEGATIVE  NEG   Final     Leukocyte Esterase   Date Value Ref Range Status   02/14/2019 LARGE (A) NEG   Final     Potassium   Date Value Ref Range Status   02/15/2019 3.4 (L) 3.5 - 5.5 mmol/L Final     Creatinine   Date Value Ref Range Status   02/15/2019 0.93 0.6 - 1.3 MG/DL Final     BUN   Date Value Ref Range Status   02/15/2019 12 7.0 - 18 MG/DL Final     Prostate Specific Ag   Date Value Ref Range Status   03/26/2012 10.9  Final      PSA No results for input(s): PSA in the last 72 hours. Coagulation Lab Results   Component Value Date/Time    Prothrombin time 13.2 02/14/2019 11:45 AM    Prothrombin time 13.6 12/30/2010 12:32 PM    INR 1.0 02/14/2019 11:45 AM    INR 1.1 12/30/2010 12:32 PM    aPTT 22.6 (L) 12/30/2010 12:32 PM    aPTT 25.4 12/30/2010 09:14 AM

## 2019-02-15 NOTE — PROGRESS NOTES
Problem: Falls - Risk of  Goal: *Absence of Falls  Document Rylie Fall Risk and appropriate interventions in the flowsheet.   Outcome: Progressing Towards Goal  Fall Risk Interventions:                 Elimination Interventions: Bed/chair exit alarm, Call light in reach, Patient to call for help with toileting needs, Urinal in reach

## 2019-02-15 NOTE — PROGRESS NOTES
[facility] Hospitalist Group  Progress Note    Patient: Renate Coley Age: 80 y.o. : 1935 MR#: [id number] SSN: [id number]  Date: 2/15/2019     Subjective:     No complaints initially, however then w/ complaint of pain to penis / lower abdomen following attempt w/ manning insertion. No SOB, CP, n/v    Assessment/Plan:   1. Hematuria - persistent and unable to insert manning catheter per nursing; has been having worsening hematuria since insertion w/ noted + retention. Spoke with Dr. Violetta Essex whom is coming to hospital to insert catheter. CT urogram ordered per urology. 2. UTI - per U/A, urine culture in progress follow for sensitivities; cont rocephin; follow fever curve  3. Hypertension now w/ hypotension - hold norvasc for now; resume IVF, give albumin x 1; hold on further narcotics  4. Type 2 DM - A1c 6.8; cont SSI  5. Hypokalemia - replaced, follow  6. Gout - no current evidence of flare  7.   BPH - cont avodart / flomax    Additional Notes:      Case discussed with:  [x]Patient  [x]Family  [x]Nursing  []Case Management  DVT Prophylaxis:  []Lovenox  []Hep SQ  [x]SCDs  []Coumadin   []On Heparin gtt    Objective:   VS:   Visit Vitals  /82 (BP 1 Location: Right arm, BP Patient Position: Sitting)   Pulse 77   Temp (!) 100.7 °F (38.2 °C)   Resp 18   SpO2 99%      Tmax/24hrs: Temp (24hrs), Av.1 °F (36.7 °C), Min:97.2 °F (36.2 °C), Max:100.7 °F (38.2 °C)      Intake/Output Summary (Last 24 hours) at 2/15/2019 1333  Last data filed at 2/15/2019 1313  Gross per 24 hour   Intake 2880 ml   Output 3550 ml   Net -670 ml     General:  Alert, NAD  Cardiovascular:  RRR  Pulmonary:  LSC throughout; respiratory effort WNL  GI:  +BS in all four quadrants, soft, non-tender  : bloody urine with clots noted  Extremities:  No edema; 2+ dorsalis pedis pulses bilaterally  Neuro: oriented x 3    Labs:    Recent Results (from the past 24 hour(s))   EKG, 12 LEAD, INITIAL    Collection Time: 19 1:33 PM   Result Value Ref Range    Ventricular Rate 70 BPM    Atrial Rate 70 BPM    P-R Interval 154 ms    QRS Duration 82 ms    Q-T Interval 414 ms    QTC Calculation (Bezet) 447 ms    Calculated P Axis 31 degrees    Calculated T Axis 61 degrees    Diagnosis       Normal sinus rhythm  Nonspecific ST abnormality  Abnormal ECG  When compared with ECG of 30-DEC-2010 09:04,  QT has lengthened     CARDIAC PANEL,(CK, CKMB & TROPONIN)    Collection Time: 02/14/19  2:15 PM   Result Value Ref Range     39 - 308 U/L    CK - MB 2.7 <3.6 ng/ml    CK-MB Index 2.4 0.0 - 4.0 %    Troponin-I, QT <0.02 0.0 - 0.045 NG/ML   POC TROPONIN-I    Collection Time: 02/14/19  3:54 PM   Result Value Ref Range    Troponin-I (POC) <0.04 0.00 - 0.08 ng/mL   CULTURE, BLOOD    Collection Time: 02/14/19  4:15 PM   Result Value Ref Range    Special Requests: NO SPECIAL REQUESTS      Culture result: NO GROWTH AFTER 14 HOURS     CULTURE, BLOOD    Collection Time: 02/14/19  4:30 PM   Result Value Ref Range    Special Requests: NO SPECIAL REQUESTS      Culture result: NO GROWTH AFTER 14 HOURS     POC LACTIC ACID    Collection Time: 02/14/19  4:31 PM   Result Value Ref Range    Lactic Acid (POC) 1.31 0.40 - 2.00 mmol/L   GLUCOSE, POC    Collection Time: 02/14/19 10:46 PM   Result Value Ref Range    Glucose (POC) 134 (H) 70 - 110 mg/dL   CULTURE, URINE    Collection Time: 02/14/19 10:48 PM   Result Value Ref Range    Special Requests: NO SPECIAL REQUESTS      Culture result: NO GROWTH AFTER 13 HOURS     METABOLIC PANEL, BASIC    Collection Time: 02/15/19  3:30 AM   Result Value Ref Range    Sodium 142 136 - 145 mmol/L    Potassium 3.4 (L) 3.5 - 5.5 mmol/L    Chloride 112 (H) 100 - 108 mmol/L    CO2 24 21 - 32 mmol/L    Anion gap 6 3.0 - 18 mmol/L    Glucose 124 (H) 74 - 99 mg/dL    BUN 12 7.0 - 18 MG/DL    Creatinine 0.93 0.6 - 1.3 MG/DL    BUN/Creatinine ratio 13 12 - 20      GFR est AA >60 >60 ml/min/1.73m2    GFR est non-AA >60 >60 ml/min/1.73m2 Calcium 8.0 (L) 8.5 - 10.1 MG/DL   CBC WITH AUTOMATED DIFF    Collection Time: 02/15/19  3:30 AM   Result Value Ref Range    WBC 5.4 4.6 - 13.2 K/uL    RBC 3.41 (L) 4.70 - 5.50 M/uL    HGB 9.3 (L) 13.0 - 16.0 g/dL    HCT 28.7 (L) 36.0 - 48.0 %    MCV 84.2 74.0 - 97.0 FL    MCH 27.3 24.0 - 34.0 PG    MCHC 32.4 31.0 - 37.0 g/dL    RDW 12.9 11.6 - 14.5 %    PLATELET 743 622 - 101 K/uL    MPV 11.2 9.2 - 11.8 FL    NEUTROPHILS 71 42 - 75 %    LYMPHOCYTES 21 20 - 51 %    MONOCYTES 4 2 - 9 %    EOSINOPHILS 4 0 - 5 %    BASOPHILS 0 0 - 3 %    ABS. NEUTROPHILS 3.9 1.8 - 8.0 K/UL    ABS. LYMPHOCYTES 1.1 0.8 - 3.5 K/UL    ABS. MONOCYTES 0.2 0 - 1.0 K/UL    ABS. EOSINOPHILS 0.2 0.0 - 0.4 K/UL    ABS.  BASOPHILS 0.0 0.0 - 0.06 K/UL    DF MANUAL      PLATELET COMMENTS ADEQUATE PLATELETS      RBC COMMENTS NORMOCYTIC, NORMOCHROMIC     HEMOGLOBIN A1C WITH EAG    Collection Time: 02/15/19  3:30 AM   Result Value Ref Range    Hemoglobin A1c 6.8 (H) 4.2 - 5.6 %    Est. average glucose 148 mg/dL   GLUCOSE, POC    Collection Time: 02/15/19  7:23 AM   Result Value Ref Range    Glucose (POC) 237 (H) 70 - 110 mg/dL   GLUCOSE, POC    Collection Time: 02/15/19 11:13 AM   Result Value Ref Range    Glucose (POC) 156 (H) 70 - 110 mg/dL     Signed By: Ethel Lai NP     February 15, 2019

## 2019-02-15 NOTE — ROUTINE PROCESS
Bedside shift change report given to CHARMAINE Coles (oncoming nurse) by Joey Lara RN (offgoing nurse). Report included the following information SBAR, Kardex, Intake/Output, MAR, Recent Results and Quality Measures.

## 2019-02-15 NOTE — PROGRESS NOTES
Reason for Admission:  Hematuria [R31.9]  Episodic lightheadedness [R42]                 RRAT Score:    12           Plan for utilizing home health: To be determined. Likelihood of Readmission:   LOW                         Transition of Care Plan:          Initial assessment completed with patient. Cognitive status of patient: oriented to time, place, person and situation. Face sheet information confirmed:  yes; his adult children were added as emergency contacts as per patient request.  This patient lives in an apartment alone; his apt. is in 3 story building and he takes the steps. Patient is able to navigate steps as needed. Prior to hospitalization, patient was considered to be independent with ADLs/IADLS : yes . However, he states that sometimes he needs help and his daughters help him with his IADLs, and brink him food. The patient states that he can obtain his medications from the pharmacy, and take his medications as directed. Patient has a current ACP document on file: no  The patient's family will be available to transport patient home upon discharge. Patient is not currently active with home health. Patient has not stayed in a skilled nursing facility or rehab. Currently, the discharge plan is Home. Patient's current insurances are [employer] and Medicaid. Care Management Interventions  PCP Verified by CM:  Yes  Last Visit to PCP: 02/14/19  Palliative Care Criteria Met (RRAT>21 & CHF Dx)?: No  Mode of Transport at Discharge: Self  Transition of Care Consult (CM Consult): Discharge Planning  Discharge Durable Medical Equipment: No  Physical Therapy Consult: No  Occupational Therapy Consult: No  Speech Therapy Consult: No  Current Support Network: Lives Alone  Confirm Follow Up Transport: Family  Plan discussed with Pt/Family/Caregiver: Yes  Discharge Location  Discharge Placement: Home with family assistance      Jazmine SAMSON, RN, [facility]  [phone]

## 2019-02-15 NOTE — DIABETES MGMT
Glycemic Control Plan of Care    T2DM with current A1c of 6.8% (2/15/2019). Pending assessment of home diabetes management and educational needs. Patient is not able to understand and requested that I speak to one of her daughters. He cannot remember the name of his diabetes medication. Received information that he has two daughters and Amy Murillo is a home health nurse and is now on the way to the hospital to see the patient. Phone: [phone]. Home diabetes medications: Verified home diabetes medication by calling the patient's PCP office of Dr. Allison Bernal and obtained the following:  Metformin 500 mg twice daily    POC BG report on 2/14/2019: 134 mg/dL. POC BG report on 2/15/2019 at time of review: 237, 156 mg/dL. Recommendation(s):  1.) Continue inpatient glycemic monitoring and correctional lispro insulin. 2.) Consider adding basal lantus insulin 5 units daily. Assessment:  Patient is 80year old with past medical history including type 2 diabetes mellitus, hypertension, hypercholesterolemia,  BPH, former smoker, and seizures - was admitted on 2/12/2019 with report that he was sent from his PCP office to ED because of GI bleed onset and report of lightheadedness. Noted:  Johnita Fudge hematuria. T2DM with current A1c of 6.8% (2/15/2019). Most recent blood glucose values:    Results for Rodney Parish (MRN [id number]) as of 2/15/2019 11:38   Ref. Range 2/14/2019 22:46   GLUCOSE,FAST - POC Latest Ref Range: 70 - 110 mg/dL 134 (H)     Results for Rodney Parish (MRN [id number]) as of 2/15/2019 11:38   Ref. Range 2/15/2019 07:23 2/15/2019 11:13   GLUCOSE,FAST - POC Latest Ref Range: 70 - 110 mg/dL 237 (H) 156 (H)     Current A1C: 6.8% (2/15/2019) which is equivalent to estimated average blood glucose of 148 mg/dL during the past 2-3 months. Current hospital diabetes medications:  Correctional lispro insulin ACHS. Normal sensitivity dose. Total daily dose insulin requirement previous day: 2/14/2019:  None.      Home diabetes medications: Verified home diabetes medication by calling the patient's PCP office of Dr. Giselle Nelson and obtained the following:  Metformin 500 mg twice daily    Diet: Diabetic consistent carb regular. Goals:  Blood glucose will be within target range of  mg/dL by 02/18/2019. Education:  2/15/2019 at 11:48 AM: Noted Metformin 500 mg BID listed but unable to verify this with patient because he cannot remember the list of his home meds. I called the patient's PCP, Dr. Giselle Nelson, and received confirmation: Metformin 500 mg BID.   Phone contact with the patient's daughter, Razia Lewis: [phone] and stated another daughter, Landry Benítez, is on the way to see their father today and I can speak to her because she is also a nurse.  ___  Refer to Diabetes Education Record  ___  Education not indicated at this time    Owen Randall RN [facility]  Pager: [phone]

## 2019-02-16 ENCOUNTER — APPOINTMENT (OUTPATIENT)
Dept: CT IMAGING | Age: 84
DRG: 696 | End: 2019-02-16
Attending: UROLOGY
Payer: MEDICARE

## 2019-02-16 LAB
ANION GAP SERPL CALC-SCNC: 5 MMOL/L (ref 3–18)
BACTERIA SPEC CULT: NORMAL
BASOPHILS # BLD: 0 K/UL (ref 0–0.1)
BASOPHILS NFR BLD: 0 % (ref 0–2)
BUN SERPL-MCNC: 17 MG/DL (ref 7–18)
BUN/CREAT SERPL: 15 (ref 12–20)
CALCIUM SERPL-MCNC: 7.5 MG/DL (ref 8.5–10.1)
CHLORIDE SERPL-SCNC: 114 MMOL/L (ref 100–108)
CO2 SERPL-SCNC: 24 MMOL/L (ref 21–32)
CREAT SERPL-MCNC: 1.14 MG/DL (ref 0.6–1.3)
DIFFERENTIAL METHOD BLD: ABNORMAL
EOSINOPHIL # BLD: 0.2 K/UL (ref 0–0.4)
EOSINOPHIL NFR BLD: 2 % (ref 0–5)
ERYTHROCYTE [DISTWIDTH] IN BLOOD BY AUTOMATED COUNT: 13.1 % (ref 11.6–14.5)
GLUCOSE BLD STRIP.AUTO-MCNC: 157 MG/DL (ref 70–110)
GLUCOSE BLD STRIP.AUTO-MCNC: 161 MG/DL (ref 70–110)
GLUCOSE BLD STRIP.AUTO-MCNC: 166 MG/DL (ref 70–110)
GLUCOSE BLD STRIP.AUTO-MCNC: 174 MG/DL (ref 70–110)
GLUCOSE SERPL-MCNC: 122 MG/DL (ref 74–99)
HCT VFR BLD AUTO: 21.7 % (ref 36–48)
HCT VFR BLD AUTO: 23.4 % (ref 36–48)
HGB BLD-MCNC: 6.8 G/DL (ref 13–16)
HGB BLD-MCNC: 7.6 G/DL (ref 13–16)
LACTATE SERPL-SCNC: 1.1 MMOL/L (ref 0.4–2)
LYMPHOCYTES # BLD: 1.8 K/UL (ref 0.9–3.6)
LYMPHOCYTES NFR BLD: 23 % (ref 21–52)
MCH RBC QN AUTO: 26.7 PG (ref 24–34)
MCHC RBC AUTO-ENTMCNC: 31.3 G/DL (ref 31–37)
MCV RBC AUTO: 85.1 FL (ref 74–97)
MONOCYTES # BLD: 0.4 K/UL (ref 0.05–1.2)
MONOCYTES NFR BLD: 5 % (ref 3–10)
NEUTS SEG # BLD: 5.3 K/UL (ref 1.8–8)
NEUTS SEG NFR BLD: 70 % (ref 40–73)
PLATELET # BLD AUTO: 146 K/UL (ref 135–420)
PMV BLD AUTO: 10.5 FL (ref 9.2–11.8)
POTASSIUM SERPL-SCNC: 3.8 MMOL/L (ref 3.5–5.5)
RBC # BLD AUTO: 2.55 M/UL (ref 4.7–5.5)
SERVICE CMNT-IMP: NORMAL
SODIUM SERPL-SCNC: 143 MMOL/L (ref 136–145)
WBC # BLD AUTO: 7.5 K/UL (ref 4.6–13.2)

## 2019-02-16 PROCEDURE — P9040 RBC LEUKOREDUCED IRRADIATED: HCPCS

## 2019-02-16 PROCEDURE — 74011250636 HC RX REV CODE- 250/636: Performed by: NURSE PRACTITIONER

## 2019-02-16 PROCEDURE — P9047 ALBUMIN (HUMAN), 25%, 50ML: HCPCS | Performed by: NURSE PRACTITIONER

## 2019-02-16 PROCEDURE — 36415 COLL VENOUS BLD VENIPUNCTURE: CPT

## 2019-02-16 PROCEDURE — 65660000000 HC RM CCU STEPDOWN

## 2019-02-16 PROCEDURE — P9016 RBC LEUKOCYTES REDUCED: HCPCS

## 2019-02-16 PROCEDURE — 74011250637 HC RX REV CODE- 250/637: Performed by: EMERGENCY MEDICINE

## 2019-02-16 PROCEDURE — 74011250636 HC RX REV CODE- 250/636: Performed by: UROLOGY

## 2019-02-16 PROCEDURE — 74011000258 HC RX REV CODE- 258: Performed by: INTERNAL MEDICINE

## 2019-02-16 PROCEDURE — 80048 BASIC METABOLIC PNL TOTAL CA: CPT

## 2019-02-16 PROCEDURE — 74178 CT ABD&PLV WO CNTR FLWD CNTR: CPT

## 2019-02-16 PROCEDURE — 30233N1 TRANSFUSION OF NONAUTOLOGOUS RED BLOOD CELLS INTO PERIPHERAL VEIN, PERCUTANEOUS APPROACH: ICD-10-PCS | Performed by: INTERNAL MEDICINE

## 2019-02-16 PROCEDURE — 86923 COMPATIBILITY TEST ELECTRIC: CPT

## 2019-02-16 PROCEDURE — 74011636637 HC RX REV CODE- 636/637: Performed by: INTERNAL MEDICINE

## 2019-02-16 PROCEDURE — 83605 ASSAY OF LACTIC ACID: CPT

## 2019-02-16 PROCEDURE — 85018 HEMOGLOBIN: CPT

## 2019-02-16 PROCEDURE — 36430 TRANSFUSION BLD/BLD COMPNT: CPT

## 2019-02-16 PROCEDURE — 85025 COMPLETE CBC W/AUTO DIFF WBC: CPT

## 2019-02-16 PROCEDURE — 86900 BLOOD TYPING SEROLOGIC ABO: CPT

## 2019-02-16 PROCEDURE — 74011250637 HC RX REV CODE- 250/637: Performed by: INTERNAL MEDICINE

## 2019-02-16 PROCEDURE — 74011250636 HC RX REV CODE- 250/636: Performed by: INTERNAL MEDICINE

## 2019-02-16 PROCEDURE — 82962 GLUCOSE BLOOD TEST: CPT

## 2019-02-16 PROCEDURE — 74011636320 HC RX REV CODE- 636/320: Performed by: INTERNAL MEDICINE

## 2019-02-16 RX ORDER — SODIUM CHLORIDE 9 MG/ML
250 INJECTION, SOLUTION INTRAVENOUS AS NEEDED
Status: DISCONTINUED | OUTPATIENT
Start: 2019-02-16 | End: 2019-02-20 | Stop reason: HOSPADM

## 2019-02-16 RX ADMIN — VANCOMYCIN HYDROCHLORIDE 1750 MG: 10 INJECTION, POWDER, LYOPHILIZED, FOR SOLUTION INTRAVENOUS at 00:58

## 2019-02-16 RX ADMIN — ALBUMIN (HUMAN) 12.5 G: 0.25 INJECTION, SOLUTION INTRAVENOUS at 00:08

## 2019-02-16 RX ADMIN — INSULIN LISPRO 2 UNITS: 100 INJECTION, SOLUTION INTRAVENOUS; SUBCUTANEOUS at 17:34

## 2019-02-16 RX ADMIN — ACETAMINOPHEN 650 MG: 325 TABLET ORAL at 17:00

## 2019-02-16 RX ADMIN — ACETAMINOPHEN 650 MG: 325 TABLET ORAL at 00:13

## 2019-02-16 RX ADMIN — DUTASTERIDE 0.5 MG: 0.5 CAPSULE, LIQUID FILLED ORAL at 09:34

## 2019-02-16 RX ADMIN — SODIUM CHLORIDE 100 ML/HR: 900 INJECTION, SOLUTION INTRAVENOUS at 00:12

## 2019-02-16 RX ADMIN — INSULIN LISPRO 2 UNITS: 100 INJECTION, SOLUTION INTRAVENOUS; SUBCUTANEOUS at 00:06

## 2019-02-16 RX ADMIN — PIPERACILLIN SODIUM,TAZOBACTAM SODIUM 3.38 G: 3; .375 INJECTION, POWDER, FOR SOLUTION INTRAVENOUS at 05:54

## 2019-02-16 RX ADMIN — PIPERACILLIN SODIUM,TAZOBACTAM SODIUM 3.38 G: 3; .375 INJECTION, POWDER, FOR SOLUTION INTRAVENOUS at 09:33

## 2019-02-16 RX ADMIN — SODIUM CHLORIDE 250 ML: 900 INJECTION, SOLUTION INTRAVENOUS at 18:06

## 2019-02-16 RX ADMIN — INSULIN LISPRO 2 UNITS: 100 INJECTION, SOLUTION INTRAVENOUS; SUBCUTANEOUS at 22:47

## 2019-02-16 RX ADMIN — PIPERACILLIN SODIUM,TAZOBACTAM SODIUM 3.38 G: 3; .375 INJECTION, POWDER, FOR SOLUTION INTRAVENOUS at 22:49

## 2019-02-16 RX ADMIN — PIPERACILLIN SODIUM,TAZOBACTAM SODIUM 3.38 G: 3; .375 INJECTION, POWDER, FOR SOLUTION INTRAVENOUS at 00:11

## 2019-02-16 RX ADMIN — IOPAMIDOL 100 ML: 755 INJECTION, SOLUTION INTRAVENOUS at 14:57

## 2019-02-16 RX ADMIN — PIPERACILLIN SODIUM,TAZOBACTAM SODIUM 3.38 G: 3; .375 INJECTION, POWDER, FOR SOLUTION INTRAVENOUS at 16:48

## 2019-02-16 RX ADMIN — INSULIN LISPRO 2 UNITS: 100 INJECTION, SOLUTION INTRAVENOUS; SUBCUTANEOUS at 09:15

## 2019-02-16 RX ADMIN — ACETAMINOPHEN 650 MG: 325 TABLET ORAL at 22:49

## 2019-02-16 RX ADMIN — VANCOMYCIN HYDROCHLORIDE 1250 MG: 10 INJECTION, POWDER, LYOPHILIZED, FOR SOLUTION INTRAVENOUS at 18:00

## 2019-02-16 NOTE — PROGRESS NOTES
Problem: Falls - Risk of  Goal: *Absence of Falls  Document Rylie Fall Risk and appropriate interventions in the flowsheet.   Outcome: Progressing Towards Goal  Fall Risk Interventions:                 Elimination Interventions: Bed/chair exit alarm

## 2019-02-16 NOTE — ROUTINE PROCESS
Bedside and Verbal shift change report given to Tianna Turner RN (oncoming nurse) by Efrain Boswell RN (offgoing nurse). Report included the following information SBAR, Kardex, Procedure Summary, Intake/Output, MAR, Recent Results and Med Rec Status.

## 2019-02-16 NOTE — PROGRESS NOTES
Patient's BP improved   Vitals stable   Drop in H/H noted below 7   Transfuse one unit PRBC   Monitor H/H   Urology must see him today   Will try to contact again to day

## 2019-02-16 NOTE — PROGRESS NOTES
[facility] Hospitalist Group  Progress Note    Patient: Tacho Hines Age: 80 y.o. : 1935 MR#: [id number] SSN: [id number]  Date: 2019     Subjective:     No pain currently; No SOB, CP, n/v - reports some mild dizziness w/ ambulation earlier    Assessment/Plan:   1. Hematuria - CBI per urology; CT urogram ordered per urology. 2. UTI - per U/A, urine culture in progress follow for sensitivities; cont rocephin; follow fever curve  3. Hypertension now w/ hypotension - improved, hold oral antihypertensives; hold on PT/OT  4. Anemia, normocytic - in setting of #1, blood transfusion x 1 ordered  5. Type 2 DM - A1c 6.8; cont SSI  6. Hypokalemia - resolved  7. Gout - no current evidence of flare  8.   BPH - cont avodart / flomax; follow renal function    Additional Notes:      Case discussed with:  [x]Patient  [x]Family  [x]Nursing  []Case Management  DVT Prophylaxis:  []Lovenox  []Hep SQ  [x]SCDs  []Coumadin   []On Heparin gtt    Objective:   VS:   Visit Vitals  BP 97/65 (BP 1 Location: Left arm, BP Patient Position: Standing)   Pulse 89   Temp 99.5 °F (37.5 °C)   Resp 20   Ht 5' 9\" (1.753 m)   Wt 86.2 kg (190 lb)   SpO2 100%   BMI 28.06 kg/m²      Tmax/24hrs: Temp (24hrs), Av °F (36.7 °C), Min:97 °F (36.1 °C), Max:99.5 °F (37.5 °C)      Intake/Output Summary (Last 24 hours) at 2019 1124  Last data filed at 2019 0813  Gross per 24 hour   Intake 34607 ml   Output 31059 ml   Net -2620 ml     General:  Alert, NAD  Cardiovascular:  RRR  Pulmonary:  LSC throughout; respiratory effort WNL  GI:  +BS in all four quadrants, soft, non-tender  : CBI w/ small amount of hematuria, improved  Extremities:  No edema; 2+ dorsalis pedis pulses bilaterally  Neuro: oriented x 3    Labs:    Recent Results (from the past 24 hour(s))   HGB & HCT    Collection Time: 02/15/19 12:39 PM   Result Value Ref Range    HGB 10.8 (L) 13.0 - 16.0 g/dL    HCT 34.1 (L) 36.0 - 48.0 %   GLUCOSE, POC Collection Time: 02/15/19  3:37 PM   Result Value Ref Range    Glucose (POC) 179 (H) 70 - 110 mg/dL   HGB & HCT    Collection Time: 02/15/19  6:19 PM   Result Value Ref Range    HGB 8.0 (L) 13.0 - 16.0 g/dL    HCT 24.3 (L) 36.0 - 48.0 %   GLUCOSE, POC    Collection Time: 02/16/19 12:05 AM   Result Value Ref Range    Glucose (POC) 161 (H) 70 - 110 mg/dL   CBC WITH AUTOMATED DIFF    Collection Time: 02/16/19  2:45 AM   Result Value Ref Range    WBC 7.5 4.6 - 13.2 K/uL    RBC 2.55 (L) 4.70 - 5.50 M/uL    HGB 6.8 (L) 13.0 - 16.0 g/dL    HCT 21.7 (L) 36.0 - 48.0 %    MCV 85.1 74.0 - 97.0 FL    MCH 26.7 24.0 - 34.0 PG    MCHC 31.3 31.0 - 37.0 g/dL    RDW 13.1 11.6 - 14.5 %    PLATELET 400 261 - 939 K/uL    MPV 10.5 9.2 - 11.8 FL    NEUTROPHILS 70 40 - 73 %    LYMPHOCYTES 23 21 - 52 %    MONOCYTES 5 3 - 10 %    EOSINOPHILS 2 0 - 5 %    BASOPHILS 0 0 - 2 %    ABS. NEUTROPHILS 5.3 1.8 - 8.0 K/UL    ABS. LYMPHOCYTES 1.8 0.9 - 3.6 K/UL    ABS. MONOCYTES 0.4 0.05 - 1.2 K/UL    ABS. EOSINOPHILS 0.2 0.0 - 0.4 K/UL    ABS.  BASOPHILS 0.0 0.0 - 0.1 K/UL    DF AUTOMATED     METABOLIC PANEL, BASIC    Collection Time: 02/16/19  2:45 AM   Result Value Ref Range    Sodium 143 136 - 145 mmol/L    Potassium 3.8 3.5 - 5.5 mmol/L    Chloride 114 (H) 100 - 108 mmol/L    CO2 24 21 - 32 mmol/L    Anion gap 5 3.0 - 18 mmol/L    Glucose 122 (H) 74 - 99 mg/dL    BUN 17 7.0 - 18 MG/DL    Creatinine 1.14 0.6 - 1.3 MG/DL    BUN/Creatinine ratio 15 12 - 20      GFR est AA >60 >60 ml/min/1.73m2    GFR est non-AA >60 >60 ml/min/1.73m2    Calcium 7.5 (L) 8.5 - 10.1 MG/DL   LACTIC ACID    Collection Time: 02/16/19  2:45 AM   Result Value Ref Range    Lactic acid 1.1 0.4 - 2.0 MMOL/L   TYPE + CROSSMATCH    Collection Time: 02/16/19  6:05 AM   Result Value Ref Range    Crossmatch Expiration 02/19/2019     ABO/Rh(D) Chauncey Favio POSITIVE     Antibody screen NEG     CALLED TO: DAWNA SANTOYO ON [id number] AT 0726 BY [facility]     Unit number [id number]     Blood component type [facility] LRIR     Unit division 00     Status of unit ALLOCATED     Crossmatch result Compatible    GLUCOSE, POC    Collection Time: 02/16/19  9:13 AM   Result Value Ref Range    Glucose (POC) 174 (H) 70 - 110 mg/dL     Signed By: Arie Byrd NP     February 16, 2019

## 2019-02-16 NOTE — PROGRESS NOTES
Urology consulted with pt and daughter, requested bladder scan and manning placement if greater than 250-300 residual.     Scan 835 residual.  This RN attempted to placed manning. Catheter could not be advanced into bladder. Pt urinating sinai blood and clots. Procedure causing excessive pain. Per Nurse manager, consult with MD for placement.

## 2019-02-16 NOTE — PROGRESS NOTES
Occupational Therapy Note:  Cancellation orders received. Will be available as needed. Thank-you.  Negra Youssef OTR/L

## 2019-02-16 NOTE — PROCEDURES
CYSTOSCOPY PROCEDURE      Patient Name: Janett January            Date of Procedure: 2/15/2019     Pre-procedure Diagnosis:     ICD-10-CM ICD-9-CM    1. Hematuria, unspecified type R31.9 599.70    2. Lightheadedness R42 780.4       Difficult catheterization    Post-procedure Diagnosis: Same    Universal Procedure Pause:  1. Correct patient confirmed:  yes  2. Allergies confirmed:  yes  3. Patient taking antiplatelet medications:  no  4. Patient taking anticoagulants:  no  5. Correct procedure and side confirmed and consent signed:  yes  6. Correct antibiotics confirmed:  yes    Consent: All risks, benefits and options were reviewed in detail and the patient agrees to procedure. Risks include but are not limited to bleeding, infection, sepsis, death, dysuria and others. Procedure: The patient was placed in the supine position, and prepped and draped in the normal fashion. 5 ml of 4% Lidocaine gel was placed in the urethra. Once adequate anesthesia was achieved; the flexible cystoscope was placed into the bladder. 0.38 wire placed in bladder. Over wire 24F catheter advanced into bladder. 30cc sterile water placed in balloon. 500cc clot irrigated out to clear. CBI started     Findings as follows:      Meatus: normal  Urethra: normal  Prostate: Visualization somewhat limited, appeared to have bladder neck narrowing from TUR  Bladder neck: abnormal  Trigone:  Unable to visualize, significant clot in bladder  Trabeculation:2+  Diverticuli:  unknown  Lesion:  unkown    Antibiotic provided:  Yes      Lab / Imaging:   Results for orders placed or performed during the hospital encounter of 02/14/19   CULTURE, URINE   Result Value Ref Range    Special Requests: NO SPECIAL REQUESTS      Culture result:        <10,000  COLONIES/mL  MIXED GRAM POSITIVE MADHU, PROBABLE SKIN/GENITAL CONTAMINATION.      CULTURE, BLOOD   Result Value Ref Range    Special Requests: NO SPECIAL REQUESTS      Culture result: NO GROWTH AFTER 14 HOURS CULTURE, BLOOD   Result Value Ref Range    Special Requests: NO SPECIAL REQUESTS      Culture result: NO GROWTH AFTER 14 HOURS     CULTURE, URINE   Result Value Ref Range    Special Requests: NO SPECIAL REQUESTS      Culture result: NO GROWTH AFTER 13 HOURS     CBC WITH AUTOMATED DIFF   Result Value Ref Range    WBC 10.5 4.6 - 13.2 K/uL    RBC 4.16 (L) 4.70 - 5.50 M/uL    HGB 11.5 (L) 13.0 - 16.0 g/dL    HCT 35.0 (L) 36.0 - 48.0 %    MCV 84.1 74.0 - 97.0 FL    MCH 27.6 24.0 - 34.0 PG    MCHC 32.9 31.0 - 37.0 g/dL    RDW 12.7 11.6 - 14.5 %    PLATELET 882 464 - 670 K/uL    MPV 10.9 9.2 - 11.8 FL    NEUTROPHILS 82 (H) 42 - 75 %    LYMPHOCYTES 14 (L) 20 - 51 %    MONOCYTES 4 2 - 9 %    EOSINOPHILS 0 0 - 5 %    BASOPHILS 0 0 - 3 %    ABS. NEUTROPHILS 8.6 (H) 1.8 - 8.0 K/UL    ABS. LYMPHOCYTES 1.5 0.8 - 3.5 K/UL    ABS. MONOCYTES 0.4 0 - 1.0 K/UL    ABS. EOSINOPHILS 0.0 0.0 - 0.4 K/UL    ABS. BASOPHILS 0.0 0.0 - 0.06 K/UL    DF MANUAL      PLATELET COMMENTS ADEQUATE PLATELETS      RBC COMMENTS NORMOCYTIC, NORMOCHROMIC     METABOLIC PANEL, COMPREHENSIVE   Result Value Ref Range    Sodium 136 136 - 145 mmol/L    Potassium 4.1 3.5 - 5.5 mmol/L    Chloride 103 100 - 108 mmol/L    CO2 24 21 - 32 mmol/L    Anion gap 9 3.0 - 18 mmol/L    Glucose 191 (H) 74 - 99 mg/dL    BUN 21 (H) 7.0 - 18 MG/DL    Creatinine 1.35 (H) 0.6 - 1.3 MG/DL    BUN/Creatinine ratio 16 12 - 20      GFR est AA >60 >60 ml/min/1.73m2    GFR est non-AA 50 (L) >60 ml/min/1.73m2    Calcium 8.4 (L) 8.5 - 10.1 MG/DL    Bilirubin, total 0.4 0.2 - 1.0 MG/DL    ALT (SGPT) 22 16 - 61 U/L    AST (SGOT) 18 15 - 37 U/L    Alk.  phosphatase 90 45 - 117 U/L    Protein, total 8.0 6.4 - 8.2 g/dL    Albumin 4.0 3.4 - 5.0 g/dL    Globulin 4.0 2.0 - 4.0 g/dL    A-G Ratio 1.0 0.8 - 1.7     PROTHROMBIN TIME + INR   Result Value Ref Range    Prothrombin time 13.2 11.5 - 15.2 sec    INR 1.0 0.8 - 1.2     URINALYSIS W/ RFLX MICROSCOPIC   Result Value Ref Range    Color RED Appearance BLOODY      Specific gravity 1.010 1.003 - 1.030      pH (UA) 8.5 (H) 5.0 - 8.0      Protein >300 (A) NEG mg/dL    Glucose 100 (A) NEG mg/dL    Ketone 40 (A) NEG mg/dL    Bilirubin LARGE (A) NEG      Blood LARGE (A) NEG      Urobilinogen 4.0 (H) 0.2 - 1.0 EU/dL    Nitrites NEGATIVE  NEG      Leukocyte Esterase LARGE (A) NEG     URINE MICROSCOPIC ONLY   Result Value Ref Range    WBC  0 - 4 /hpf     UNABLE TO QUANTITATE MICROSCOPIC PARAMETERS DUE TO EXCESSIVE RBCS    RBC TOO NUMEROUS TO COUNT 0 - 5 /hpf    Epithelial cells  0 - 5 /lpf     UNABLE TO QUANTITATE MICROSCOPIC PARAMETERS DUE TO EXCESSIVE RBCS    Bacteria (A) NEG /hpf     UNABLE TO QUANTITATE MICROSCOPIC PARAMETERS DUE TO EXCESSIVE RBCS    Other:        Macroscopic performed on spun urine due to gross blood  or mucus   CARDIAC PANEL,(CK, CKMB & TROPONIN)   Result Value Ref Range     39 - 308 U/L    CK - MB 2.7 <3.6 ng/ml    CK-MB Index 2.4 0.0 - 4.0 %    Troponin-I, QT <0.02 0.0 - 0.976 NG/ML   METABOLIC PANEL, BASIC   Result Value Ref Range    Sodium 142 136 - 145 mmol/L    Potassium 3.4 (L) 3.5 - 5.5 mmol/L    Chloride 112 (H) 100 - 108 mmol/L    CO2 24 21 - 32 mmol/L    Anion gap 6 3.0 - 18 mmol/L    Glucose 124 (H) 74 - 99 mg/dL    BUN 12 7.0 - 18 MG/DL    Creatinine 0.93 0.6 - 1.3 MG/DL    BUN/Creatinine ratio 13 12 - 20      GFR est AA >60 >60 ml/min/1.73m2    GFR est non-AA >60 >60 ml/min/1.73m2    Calcium 8.0 (L) 8.5 - 10.1 MG/DL   CBC WITH AUTOMATED DIFF   Result Value Ref Range    WBC 5.4 4.6 - 13.2 K/uL    RBC 3.41 (L) 4.70 - 5.50 M/uL    HGB 9.3 (L) 13.0 - 16.0 g/dL    HCT 28.7 (L) 36.0 - 48.0 %    MCV 84.2 74.0 - 97.0 FL    MCH 27.3 24.0 - 34.0 PG    MCHC 32.4 31.0 - 37.0 g/dL    RDW 12.9 11.6 - 14.5 %    PLATELET 413 159 - 810 K/uL    MPV 11.2 9.2 - 11.8 FL    NEUTROPHILS 71 42 - 75 %    LYMPHOCYTES 21 20 - 51 %    MONOCYTES 4 2 - 9 %    EOSINOPHILS 4 0 - 5 %    BASOPHILS 0 0 - 3 %    ABS. NEUTROPHILS 3.9 1.8 - 8.0 K/UL    ABS. LYMPHOCYTES 1.1 0.8 - 3.5 K/UL    ABS. MONOCYTES 0.2 0 - 1.0 K/UL    ABS. EOSINOPHILS 0.2 0.0 - 0.4 K/UL    ABS. BASOPHILS 0.0 0.0 - 0.06 K/UL    DF MANUAL      PLATELET COMMENTS ADEQUATE PLATELETS      RBC COMMENTS NORMOCYTIC, NORMOCHROMIC     HEMOGLOBIN A1C WITH EAG   Result Value Ref Range    Hemoglobin A1c 6.8 (H) 4.2 - 5.6 %    Est. average glucose 148 mg/dL   HGB & HCT   Result Value Ref Range    HGB 10.8 (L) 13.0 - 16.0 g/dL    HCT 34.1 (L) 36.0 - 48.0 %   HGB & HCT   Result Value Ref Range    HGB 8.0 (L) 13.0 - 16.0 g/dL    HCT 24.3 (L) 36.0 - 48.0 %   POC TROPONIN-I   Result Value Ref Range    Troponin-I (POC) <0.04 0.00 - 0.08 ng/mL   POC LACTIC ACID   Result Value Ref Range    Lactic Acid (POC) 1.31 0.40 - 2.00 mmol/L   GLUCOSE, POC   Result Value Ref Range    Glucose (POC) 134 (H) 70 - 110 mg/dL   GLUCOSE, POC   Result Value Ref Range    Glucose (POC) 237 (H) 70 - 110 mg/dL   GLUCOSE, POC   Result Value Ref Range    Glucose (POC) 156 (H) 70 - 110 mg/dL   GLUCOSE, POC   Result Value Ref Range    Glucose (POC) 179 (H) 70 - 110 mg/dL   EKG, 12 LEAD, INITIAL   Result Value Ref Range    Ventricular Rate 70 BPM    Atrial Rate 70 BPM    P-R Interval 154 ms    QRS Duration 82 ms    Q-T Interval 414 ms    QTC Calculation (Bezet) 447 ms    Calculated P Axis 31 degrees    Calculated T Axis 61 degrees    Diagnosis       Normal sinus rhythm  Nonspecific ST abnormality  Abnormal ECG  When compared with ECG of 30-DEC-2010 09:04,  No significant change was found  Confirmed by Pascual Mejia MD, Asiya Suggs ([id number]) on 2/15/2019 4:22:28 PM          Diagnoses:       ICD-10-CM ICD-9-CM    1. Hematuria, unspecified type R31.9 599.70    2.  Lightheadedness R42 780.4           SUMMARY OF VISIT AND PLAN:    Con't CBI overnight  CT scan in am  Likely home with catheter, will need formal cysto as outpatient    Dhara Villanueva MD

## 2019-02-16 NOTE — ROUTINE PROCESS
Cancellation orders received. Will be available as needed. Thank-you for this referral. Will follow up as new order available. Curtis Soares, PT, DPT.

## 2019-02-16 NOTE — PROGRESS NOTES
Bladder scan performed and residual called to Dr. Eliana Griffith. Dr. Eliana Griffith reports he will be on the unit later this evening to place manning, requested manning cart from the OR, which is already at the bedside. Rupesh Ellis

## 2019-02-16 NOTE — ROUTINE PROCESS
Bedside shift change report given to CHARMAINE Coles (oncoming nurse) by Jim Ruiz RN (offgoing nurse). Report included the following information SBAR, Kardex, Intake/Output, MAR, Recent Results, Procedure Verification and Quality Measures.

## 2019-02-16 NOTE — PROGRESS NOTES
Progress Note    Patient: Yunier Balbuena MRN: [id number]  SSN: [id number]    YOB: 1935  Age: 80 y.o. Sex: male      Admit Date: 2/14/2019    LOS: 2 days     Subjective:     No events, urine clear    Objective:     Vitals:    02/16/19 0624 02/16/19 0839 02/16/19 1540 02/16/19 1606   BP: 114/68 97/65 152/82 135/69   Pulse: 63 89 71 68   Resp: 16 20 20 20   Temp: 97.1 °F (36.2 °C) 99.5 °F (37.5 °C) 99.1 °F (37.3 °C) 100.2 °F (37.9 °C)   SpO2: 95% 100% 97% 94%   Weight:       Height:            Intake and Output:  Current Shift: 02/16 0701 - 02/16 1900  In: 33708   Out: 50468 [Urine:66185]  Last three shifts: 02/14 1901 - 02/16 0700  In: 1010 [P.O.:1000;  I.V.:10]  Out: 5250 [Urine:5250]    Physical Exam:   GENERAL: alert, cooperative, no distress, appears stated age  LUNG: unlabored  HEART: regular rate and rhythm  ABDOMEN: Soft, Non tender  EXTREMITIES:  extremities normal, atraumatic, no cyanosis or edema  SKIN: Normal.  PSYCHIATRIC: non focal  Ortega clear    Lab/Data Review:    Lab Results   Component Value Date/Time    WBC 7.5 02/16/2019 02:45 AM    Hemoglobin, POC 12.6 12/02/2016 10:30 AM    HGB 6.8 (L) 02/16/2019 02:45 AM    Hematocrit, POC 37 12/02/2016 10:30 AM    HCT 21.7 (L) 02/16/2019 02:45 AM    PLATELET 314 68/44/5018 02:45 AM    MCV 85.1 02/16/2019 02:45 AM       Lab Results   Component Value Date/Time    Sodium 143 02/16/2019 02:45 AM    Potassium 3.8 02/16/2019 02:45 AM    Chloride 114 (H) 02/16/2019 02:45 AM    CO2 24 02/16/2019 02:45 AM    Anion gap 5 02/16/2019 02:45 AM    Glucose 122 (H) 02/16/2019 02:45 AM    BUN 17 02/16/2019 02:45 AM    Creatinine 1.14 02/16/2019 02:45 AM    BUN/Creatinine ratio 15 02/16/2019 02:45 AM    GFR est AA >60 02/16/2019 02:45 AM    GFR est non-AA >60 02/16/2019 02:45 AM    Calcium 7.5 (L) 02/16/2019 02:45 AM         83yoM with gross hematuria, cleared with cbi    Assessment:     Active Problems:    Hematuria (2/14/2019)      Episodic lightheadedness (2/14/2019)        Plan:       CTU read pending, large prostate on CT  Titrate to light pink  Will need home with manning, f/u in office for cysto.     Transfuse per primary, no signs active bleeding from  tract    Signed By: Carlos Devi MD     February 16, 2019

## 2019-02-17 LAB
ABO + RH BLD: NORMAL
ANION GAP SERPL CALC-SCNC: 5 MMOL/L (ref 3–18)
BASOPHILS # BLD: 0 K/UL (ref 0–0.1)
BASOPHILS NFR BLD: 0 % (ref 0–2)
BLD PROD TYP BPU: NORMAL
BLOOD GROUP ANTIBODIES SERPL: NORMAL
BPU ID: NORMAL
BUN SERPL-MCNC: 11 MG/DL (ref 7–18)
BUN/CREAT SERPL: 11 (ref 12–20)
CALCIUM SERPL-MCNC: 7.7 MG/DL (ref 8.5–10.1)
CALLED TO:,BCALL1: NORMAL
CHLORIDE SERPL-SCNC: 113 MMOL/L (ref 100–108)
CO2 SERPL-SCNC: 23 MMOL/L (ref 21–32)
CREAT SERPL-MCNC: 1.04 MG/DL (ref 0.6–1.3)
CROSSMATCH RESULT,%XM: NORMAL
DIFFERENTIAL METHOD BLD: ABNORMAL
EOSINOPHIL # BLD: 0.3 K/UL (ref 0–0.4)
EOSINOPHIL NFR BLD: 4 % (ref 0–5)
ERYTHROCYTE [DISTWIDTH] IN BLOOD BY AUTOMATED COUNT: 13.4 % (ref 11.6–14.5)
GLUCOSE BLD STRIP.AUTO-MCNC: 130 MG/DL (ref 70–110)
GLUCOSE BLD STRIP.AUTO-MCNC: 166 MG/DL (ref 70–110)
GLUCOSE BLD STRIP.AUTO-MCNC: 239 MG/DL (ref 70–110)
GLUCOSE SERPL-MCNC: 114 MG/DL (ref 74–99)
HCT VFR BLD AUTO: 21.3 % (ref 36–48)
HCT VFR BLD AUTO: 23.4 % (ref 36–48)
HGB BLD-MCNC: 6.8 G/DL (ref 13–16)
HGB BLD-MCNC: 7.7 G/DL (ref 13–16)
LYMPHOCYTES # BLD: 1.7 K/UL (ref 0.9–3.6)
LYMPHOCYTES NFR BLD: 22 % (ref 21–52)
MCH RBC QN AUTO: 27 PG (ref 24–34)
MCHC RBC AUTO-ENTMCNC: 31.9 G/DL (ref 31–37)
MCV RBC AUTO: 84.5 FL (ref 74–97)
MONOCYTES # BLD: 0.5 K/UL (ref 0.05–1.2)
MONOCYTES NFR BLD: 6 % (ref 3–10)
NEUTS SEG # BLD: 5.2 K/UL (ref 1.8–8)
NEUTS SEG NFR BLD: 68 % (ref 40–73)
PLATELET # BLD AUTO: 138 K/UL (ref 135–420)
PMV BLD AUTO: 10.9 FL (ref 9.2–11.8)
POTASSIUM SERPL-SCNC: 3.5 MMOL/L (ref 3.5–5.5)
RBC # BLD AUTO: 2.52 M/UL (ref 4.7–5.5)
SODIUM SERPL-SCNC: 141 MMOL/L (ref 136–145)
SPECIMEN EXP DATE BLD: NORMAL
STATUS OF UNIT,%ST: NORMAL
UNIT DIVISION, %UDIV: 0
WBC # BLD AUTO: 7.7 K/UL (ref 4.6–13.2)

## 2019-02-17 PROCEDURE — 85025 COMPLETE CBC W/AUTO DIFF WBC: CPT

## 2019-02-17 PROCEDURE — 74011250636 HC RX REV CODE- 250/636: Performed by: UROLOGY

## 2019-02-17 PROCEDURE — 74011636637 HC RX REV CODE- 636/637: Performed by: INTERNAL MEDICINE

## 2019-02-17 PROCEDURE — 86923 COMPATIBILITY TEST ELECTRIC: CPT

## 2019-02-17 PROCEDURE — 65660000000 HC RM CCU STEPDOWN

## 2019-02-17 PROCEDURE — 74011250636 HC RX REV CODE- 250/636: Performed by: NURSE PRACTITIONER

## 2019-02-17 PROCEDURE — 86850 RBC ANTIBODY SCREEN: CPT

## 2019-02-17 PROCEDURE — 82962 GLUCOSE BLOOD TEST: CPT

## 2019-02-17 PROCEDURE — 85018 HEMOGLOBIN: CPT

## 2019-02-17 PROCEDURE — 74011250636 HC RX REV CODE- 250/636: Performed by: INTERNAL MEDICINE

## 2019-02-17 PROCEDURE — 74011250637 HC RX REV CODE- 250/637: Performed by: INTERNAL MEDICINE

## 2019-02-17 PROCEDURE — 36430 TRANSFUSION BLD/BLD COMPNT: CPT

## 2019-02-17 PROCEDURE — 74011250637 HC RX REV CODE- 250/637: Performed by: EMERGENCY MEDICINE

## 2019-02-17 PROCEDURE — 80048 BASIC METABOLIC PNL TOTAL CA: CPT

## 2019-02-17 PROCEDURE — 74011000258 HC RX REV CODE- 258: Performed by: INTERNAL MEDICINE

## 2019-02-17 PROCEDURE — P9016 RBC LEUKOCYTES REDUCED: HCPCS

## 2019-02-17 PROCEDURE — 36415 COLL VENOUS BLD VENIPUNCTURE: CPT

## 2019-02-17 RX ORDER — SODIUM CHLORIDE 9 MG/ML
250 INJECTION, SOLUTION INTRAVENOUS AS NEEDED
Status: DISCONTINUED | OUTPATIENT
Start: 2019-02-17 | End: 2019-02-20 | Stop reason: HOSPADM

## 2019-02-17 RX ADMIN — DUTASTERIDE 0.5 MG: 0.5 CAPSULE, LIQUID FILLED ORAL at 09:02

## 2019-02-17 RX ADMIN — PIPERACILLIN SODIUM,TAZOBACTAM SODIUM 3.38 G: 3; .375 INJECTION, POWDER, FOR SOLUTION INTRAVENOUS at 03:52

## 2019-02-17 RX ADMIN — SODIUM CHLORIDE 100 ML/HR: 900 INJECTION, SOLUTION INTRAVENOUS at 23:37

## 2019-02-17 RX ADMIN — ACETAMINOPHEN 650 MG: 325 TABLET ORAL at 23:59

## 2019-02-17 RX ADMIN — ACETAMINOPHEN 650 MG: 325 TABLET ORAL at 14:57

## 2019-02-17 RX ADMIN — INSULIN LISPRO 2 UNITS: 100 INJECTION, SOLUTION INTRAVENOUS; SUBCUTANEOUS at 08:51

## 2019-02-17 RX ADMIN — PIPERACILLIN SODIUM,TAZOBACTAM SODIUM 3.38 G: 3; .375 INJECTION, POWDER, FOR SOLUTION INTRAVENOUS at 17:32

## 2019-02-17 RX ADMIN — VANCOMYCIN HYDROCHLORIDE 1250 MG: 10 INJECTION, POWDER, LYOPHILIZED, FOR SOLUTION INTRAVENOUS at 12:16

## 2019-02-17 RX ADMIN — INSULIN LISPRO 4 UNITS: 100 INJECTION, SOLUTION INTRAVENOUS; SUBCUTANEOUS at 12:09

## 2019-02-17 RX ADMIN — PIPERACILLIN SODIUM,TAZOBACTAM SODIUM 3.38 G: 3; .375 INJECTION, POWDER, FOR SOLUTION INTRAVENOUS at 09:00

## 2019-02-17 RX ADMIN — PIPERACILLIN SODIUM,TAZOBACTAM SODIUM 3.38 G: 3; .375 INJECTION, POWDER, FOR SOLUTION INTRAVENOUS at 23:38

## 2019-02-17 RX ADMIN — ACETAMINOPHEN 650 MG: 325 TABLET ORAL at 09:08

## 2019-02-17 NOTE — PROGRESS NOTES
Progress Note    Patient: Monty Pillai MRN: [id number]  SSN: [id number]    YOB: 1935  Age: 80 y.o.   Sex: male      Admit Date: 2/14/2019    LOS: 3 days     Subjective:     No events, urine clear on slow drip    Objective:     Vitals:    02/17/19 0958 02/17/19 1058 02/17/19 1158 02/17/19 1514   BP: 128/68 115/61 161/70 148/74   Pulse: 68 61 68 60   Resp: 23 20 18 20   Temp: 99.4 °F (37.4 °C) 99.1 °F (37.3 °C) 98.3 °F (36.8 °C) 98.7 °F (37.1 °C)   SpO2: 96% 96% 96% 96%   Weight:       Height:            Intake and Output:  Current Shift: 02/17 0701 - 02/17 1900  In: 1188.8 [P.O.:920]  Out: 2100 [Urine:2100]  Last three shifts: 02/15 1901 - 02/17 0700  In: 25050.5 [I.V.:4121.6]  Out: 56273 [Urine:63312]    Physical Exam:   GENERAL: alert, cooperative, no distress, appears stated age  LUNG: unlabored  HEART: regular rate and rhythm  ABDOMEN: Soft, Non tender  EXTREMITIES:  extremities normal, atraumatic, no cyanosis or edema  SKIN: Normal.  PSYCHIATRIC: non focal  Ortega clear    Lab/Data Review:    Lab Results   Component Value Date/Time    WBC 7.7 02/17/2019 04:44 AM    Hemoglobin, POC 12.6 12/02/2016 10:30 AM    HGB 7.7 (L) 02/17/2019 02:05 PM    Hematocrit, POC 37 12/02/2016 10:30 AM    HCT 23.4 (L) 02/17/2019 02:05 PM    PLATELET 511 48/07/3066 04:44 AM    MCV 84.5 02/17/2019 04:44 AM       Lab Results   Component Value Date/Time    Sodium 141 02/17/2019 04:44 AM    Potassium 3.5 02/17/2019 04:44 AM    Chloride 113 (H) 02/17/2019 04:44 AM    CO2 23 02/17/2019 04:44 AM    Anion gap 5 02/17/2019 04:44 AM    Glucose 114 (H) 02/17/2019 04:44 AM    BUN 11 02/17/2019 04:44 AM    Creatinine 1.04 02/17/2019 04:44 AM    BUN/Creatinine ratio 11 (L) 02/17/2019 04:44 AM    GFR est AA >60 02/17/2019 04:44 AM    GFR est non-AA >60 02/17/2019 04:44 AM    Calcium 7.7 (L) 02/17/2019 04:44 AM         83yoM with gross hematuria, cleared with cbi    Assessment:     Active Problems:    Hematuria (2/14/2019) Episodic lightheadedness (2/14/2019)        Plan:       CTU read still pending, large prostate on CT  Titrate to light pink - likely clamp cbi in am  Will need home with manning, f/u in office for cysto.     Transfuse per primary, no signs active bleeding from  tract    Signed By: Cinthya Courtney MD     February 17, 2019

## 2019-02-17 NOTE — PROGRESS NOTES
[facility]ist [facility]  Progress Note    Patient: Alivia Healy Age: 80 y.o. : 1935 MR#: [id number] SSN: [id number]  Date: 2019     Subjective:     Reports pain when passing blood clots but otherwise pain free, no other complaints including no dizziness, lightheadedness, SOB or CP    Assessment/Plan:   1. Hematuria - CBI per urology; likely clamp CBI in AM; anticipate d/c with manning, cystoscopy as OP  2. UTI - per U/A, urine culture in progress follow for sensitivities; cont rocephin; follow fever curve  3. Hypertension now w/ hypotension - improved, hold oral antihypertensives  4. Anemia, normocytic - in setting of #1, additional blood transfusion ordered; check iron studies in am  5. Type 2 DM - A1c 6.8; cont SSI  6. Hypokalemia - resolved, follow in am  7. Gout - no current evidence of flare  8.   BPH - cont avodart / flomax; follow renal function    Addendum CT urogram reviewed and concerning for bladder mass    Additional Notes:      Case discussed with:  [x]Patient  [x]Family  [x]Nursing  []Case Management  DVT Prophylaxis:  []Lovenox  []Hep SQ  [x]SCDs  []Coumadin   []On Heparin gtt    Objective:   VS:   Visit Vitals  /69 (BP 1 Location: Left arm, BP Patient Position: At rest)   Pulse 72   Temp 98.1 °F (36.7 °C)   Resp 25   Ht 5' 9\" (1.753 m)   Wt 93.1 kg (205 lb 3.2 oz)   SpO2 95%   BMI 30.30 kg/m²      Tmax/24hrs: Temp (24hrs), Av.8 °F (37.1 °C), Min:98.1 °F (36.7 °C), Max:100.2 °F (37.9 °C)      Intake/Output Summary (Last 24 hours) at 2019 1027  Last data filed at 2019 9694  Gross per 24 hour   Intake 05410.48 ml   Output 36638 ml   Net 15513.48 ml     General:  Alert, NAD  Cardiovascular:  RRR  Pulmonary:  LSC throughout; respiratory effort WNL  GI:  +BS in all four quadrants, soft, non-tender  : CBI; improved  Extremities:  No edema; 2+ dorsalis pedis pulses bilaterally  Neuro: oriented x 3    Labs:    Recent Results (from the past 24 hour(s)) GLUCOSE, POC    Collection Time: 02/16/19  4:14 PM   Result Value Ref Range    Glucose (POC) 157 (H) 70 - 110 mg/dL   HGB & HCT    Collection Time: 02/16/19  6:43 PM   Result Value Ref Range    HGB 7.6 (L) 13.0 - 16.0 g/dL    HCT 23.4 (L) 36.0 - 48.0 %   GLUCOSE, POC    Collection Time: 02/16/19 10:07 PM   Result Value Ref Range    Glucose (POC) 166 (H) 70 - 979 mg/dL   METABOLIC PANEL, BASIC    Collection Time: 02/17/19  4:44 AM   Result Value Ref Range    Sodium 141 136 - 145 mmol/L    Potassium 3.5 3.5 - 5.5 mmol/L    Chloride 113 (H) 100 - 108 mmol/L    CO2 23 21 - 32 mmol/L    Anion gap 5 3.0 - 18 mmol/L    Glucose 114 (H) 74 - 99 mg/dL    BUN 11 7.0 - 18 MG/DL    Creatinine 1.04 0.6 - 1.3 MG/DL    BUN/Creatinine ratio 11 (L) 12 - 20      GFR est AA >60 >60 ml/min/1.73m2    GFR est non-AA >60 >60 ml/min/1.73m2    Calcium 7.7 (L) 8.5 - 10.1 MG/DL   CBC WITH AUTOMATED DIFF    Collection Time: 02/17/19  4:44 AM   Result Value Ref Range    WBC 7.7 4.6 - 13.2 K/uL    RBC 2.52 (L) 4.70 - 5.50 M/uL    HGB 6.8 (L) 13.0 - 16.0 g/dL    HCT 21.3 (L) 36.0 - 48.0 %    MCV 84.5 74.0 - 97.0 FL    MCH 27.0 24.0 - 34.0 PG    MCHC 31.9 31.0 - 37.0 g/dL    RDW 13.4 11.6 - 14.5 %    PLATELET 203 568 - 502 K/uL    MPV 10.9 9.2 - 11.8 FL    NEUTROPHILS 68 40 - 73 %    LYMPHOCYTES 22 21 - 52 %    MONOCYTES 6 3 - 10 %    EOSINOPHILS 4 0 - 5 %    BASOPHILS 0 0 - 2 %    ABS. NEUTROPHILS 5.2 1.8 - 8.0 K/UL    ABS. LYMPHOCYTES 1.7 0.9 - 3.6 K/UL    ABS. MONOCYTES 0.5 0.05 - 1.2 K/UL    ABS. EOSINOPHILS 0.3 0.0 - 0.4 K/UL    ABS.  BASOPHILS 0.0 0.0 - 0.1 K/UL    DF AUTOMATED     TYPE & SCREEN    Collection Time: 02/17/19  6:27 AM   Result Value Ref Range    Crossmatch Expiration 02/20/2019     ABO/Rh(D) Jan Souza POSITIVE     Antibody screen NEG     CALLED TO: DAWNA SANTOYO ON [id number] AT 0372 BY [facility]     Unit number [id number]     Blood component type  LR,1     Unit division 00     Status of unit ISSUED     Crossmatch result Compatible GLUCOSE, POC    Collection Time: 02/17/19  8:03 AM   Result Value Ref Range    Glucose (POC) 166 (H) 70 - 110 mg/dL     Signed By: Sebastian Coulter NP     February 17, 2019

## 2019-02-17 NOTE — PROGRESS NOTES
Patient with gross hematuria leading to anemia of acute blood loss and hypotension - improved but still low H/H   - Transfuse and monitor h/h   Follow with urology   Full Note to follow   D/w APC  Patient interviewed and examined independently .    Management plan reviewed   APC's note reviewed , agreed with exam and A&P

## 2019-02-17 NOTE — ROUTINE PROCESS
Bedside and Verbal shift change report given to Andie Awan RN (oncoming nurse) by Darshan Buchanan RN (offgoing nurse). Report included the following information SBAR, Kardex, Procedure Summary, Intake/Output and Recent Results.

## 2019-02-17 NOTE — PROGRESS NOTES
Problem: Falls - Risk of  Goal: *Absence of Falls  Document Rylie Fall Risk and appropriate interventions in the flowsheet.   Outcome: Progressing Towards Goal  Fall Risk Interventions:            Medication Interventions: Evaluate medications/consider consulting pharmacy    Elimination Interventions: Bed/chair exit alarm

## 2019-02-18 LAB
ABO + RH BLD: NORMAL
ANION GAP SERPL CALC-SCNC: 7 MMOL/L (ref 3–18)
BASOPHILS # BLD: 0 K/UL (ref 0–0.1)
BASOPHILS NFR BLD: 0 % (ref 0–2)
BLD PROD TYP BPU: NORMAL
BLOOD GROUP ANTIBODIES SERPL: NORMAL
BPU ID: NORMAL
BUN SERPL-MCNC: 7 MG/DL (ref 7–18)
BUN/CREAT SERPL: 6 (ref 12–20)
CALCIUM SERPL-MCNC: 8.2 MG/DL (ref 8.5–10.1)
CALLED TO:,BCALL1: NORMAL
CHLORIDE SERPL-SCNC: 109 MMOL/L (ref 100–108)
CO2 SERPL-SCNC: 24 MMOL/L (ref 21–32)
CREAT SERPL-MCNC: 1.16 MG/DL (ref 0.6–1.3)
CROSSMATCH RESULT,%XM: NORMAL
DATE LAST DOSE: ABNORMAL
DIFFERENTIAL METHOD BLD: ABNORMAL
EOSINOPHIL # BLD: 0.3 K/UL (ref 0–0.4)
EOSINOPHIL NFR BLD: 3 % (ref 0–5)
ERYTHROCYTE [DISTWIDTH] IN BLOOD BY AUTOMATED COUNT: 13.6 % (ref 11.6–14.5)
GLUCOSE BLD STRIP.AUTO-MCNC: 127 MG/DL (ref 70–110)
GLUCOSE BLD STRIP.AUTO-MCNC: 140 MG/DL (ref 70–110)
GLUCOSE BLD STRIP.AUTO-MCNC: 141 MG/DL (ref 70–110)
GLUCOSE BLD STRIP.AUTO-MCNC: 146 MG/DL (ref 70–110)
GLUCOSE SERPL-MCNC: 148 MG/DL (ref 74–99)
HCT VFR BLD AUTO: 25.1 % (ref 36–48)
HGB BLD-MCNC: 8.2 G/DL (ref 13–16)
LYMPHOCYTES # BLD: 1.4 K/UL (ref 0.9–3.6)
LYMPHOCYTES NFR BLD: 15 % (ref 21–52)
MAGNESIUM SERPL-MCNC: 2 MG/DL (ref 1.6–2.6)
MCH RBC QN AUTO: 27.9 PG (ref 24–34)
MCHC RBC AUTO-ENTMCNC: 32.7 G/DL (ref 31–37)
MCV RBC AUTO: 85.4 FL (ref 74–97)
MONOCYTES # BLD: 0.7 K/UL (ref 0.05–1.2)
MONOCYTES NFR BLD: 8 % (ref 3–10)
NEUTS SEG # BLD: 7.1 K/UL (ref 1.8–8)
NEUTS SEG NFR BLD: 74 % (ref 40–73)
PLATELET # BLD AUTO: 141 K/UL (ref 135–420)
PMV BLD AUTO: 10.2 FL (ref 9.2–11.8)
POTASSIUM SERPL-SCNC: 3.2 MMOL/L (ref 3.5–5.5)
RBC # BLD AUTO: 2.94 M/UL (ref 4.7–5.5)
REPORTED DOSE,DOSE: ABNORMAL UNITS
REPORTED DOSE/TIME,TMG: 1200
SODIUM SERPL-SCNC: 140 MMOL/L (ref 136–145)
SPECIMEN EXP DATE BLD: NORMAL
STATUS OF UNIT,%ST: NORMAL
UNIT DIVISION, %UDIV: 0
VANCOMYCIN TROUGH SERPL-MCNC: 7.4 UG/ML (ref 10–20)
WBC # BLD AUTO: 9.5 K/UL (ref 4.6–13.2)

## 2019-02-18 PROCEDURE — 83735 ASSAY OF MAGNESIUM: CPT

## 2019-02-18 PROCEDURE — 80048 BASIC METABOLIC PNL TOTAL CA: CPT

## 2019-02-18 PROCEDURE — 74011250636 HC RX REV CODE- 250/636: Performed by: INTERNAL MEDICINE

## 2019-02-18 PROCEDURE — 36415 COLL VENOUS BLD VENIPUNCTURE: CPT

## 2019-02-18 PROCEDURE — 74011250637 HC RX REV CODE- 250/637: Performed by: NURSE PRACTITIONER

## 2019-02-18 PROCEDURE — 74011250637 HC RX REV CODE- 250/637: Performed by: INTERNAL MEDICINE

## 2019-02-18 PROCEDURE — 74011000258 HC RX REV CODE- 258: Performed by: INTERNAL MEDICINE

## 2019-02-18 PROCEDURE — 65660000000 HC RM CCU STEPDOWN

## 2019-02-18 PROCEDURE — 80202 ASSAY OF VANCOMYCIN: CPT

## 2019-02-18 PROCEDURE — 74011250636 HC RX REV CODE- 250/636: Performed by: UROLOGY

## 2019-02-18 PROCEDURE — 82962 GLUCOSE BLOOD TEST: CPT

## 2019-02-18 PROCEDURE — 85025 COMPLETE CBC W/AUTO DIFF WBC: CPT

## 2019-02-18 PROCEDURE — 97116 GAIT TRAINING THERAPY: CPT

## 2019-02-18 PROCEDURE — 97161 PT EVAL LOW COMPLEX 20 MIN: CPT

## 2019-02-18 PROCEDURE — 97165 OT EVAL LOW COMPLEX 30 MIN: CPT

## 2019-02-18 PROCEDURE — 74011250637 HC RX REV CODE- 250/637: Performed by: EMERGENCY MEDICINE

## 2019-02-18 RX ORDER — AMLODIPINE BESYLATE 5 MG/1
5 TABLET ORAL DAILY
Status: DISCONTINUED | OUTPATIENT
Start: 2019-02-18 | End: 2019-02-20 | Stop reason: HOSPADM

## 2019-02-18 RX ORDER — POTASSIUM CHLORIDE 20 MEQ/1
40 TABLET, EXTENDED RELEASE ORAL
Status: COMPLETED | OUTPATIENT
Start: 2019-02-18 | End: 2019-02-18

## 2019-02-18 RX ORDER — METOPROLOL TARTRATE 25 MG/1
25 TABLET, FILM COATED ORAL EVERY 12 HOURS
Status: DISCONTINUED | OUTPATIENT
Start: 2019-02-18 | End: 2019-02-20 | Stop reason: HOSPADM

## 2019-02-18 RX ADMIN — ACETAMINOPHEN 650 MG: 325 TABLET ORAL at 20:14

## 2019-02-18 RX ADMIN — PIPERACILLIN SODIUM,TAZOBACTAM SODIUM 3.38 G: 3; .375 INJECTION, POWDER, FOR SOLUTION INTRAVENOUS at 04:47

## 2019-02-18 RX ADMIN — AMLODIPINE BESYLATE 5 MG: 5 TABLET ORAL at 09:26

## 2019-02-18 RX ADMIN — VANCOMYCIN HYDROCHLORIDE 1250 MG: 10 INJECTION, POWDER, LYOPHILIZED, FOR SOLUTION INTRAVENOUS at 05:54

## 2019-02-18 RX ADMIN — HYDRALAZINE HYDROCHLORIDE 10 MG: 20 INJECTION INTRAMUSCULAR; INTRAVENOUS at 00:06

## 2019-02-18 RX ADMIN — PIPERACILLIN SODIUM,TAZOBACTAM SODIUM 3.38 G: 3; .375 INJECTION, POWDER, FOR SOLUTION INTRAVENOUS at 15:25

## 2019-02-18 RX ADMIN — DUTASTERIDE 0.5 MG: 0.5 CAPSULE, LIQUID FILLED ORAL at 09:33

## 2019-02-18 RX ADMIN — POTASSIUM CHLORIDE 40 MEQ: 20 TABLET, EXTENDED RELEASE ORAL at 06:46

## 2019-02-18 RX ADMIN — METOPROLOL TARTRATE 25 MG: 25 TABLET ORAL at 20:14

## 2019-02-18 RX ADMIN — METOPROLOL TARTRATE 25 MG: 25 TABLET ORAL at 09:26

## 2019-02-18 RX ADMIN — ACETAMINOPHEN 650 MG: 325 TABLET ORAL at 15:03

## 2019-02-18 RX ADMIN — PIPERACILLIN SODIUM,TAZOBACTAM SODIUM 3.38 G: 3; .375 INJECTION, POWDER, FOR SOLUTION INTRAVENOUS at 09:26

## 2019-02-18 NOTE — PROGRESS NOTES
Problem: Mobility Impaired (Adult and Pediatric)  Goal: *Acute Goals and Plan of Care (Insert Text)  physical Therapy EVALUATION & Discharge    Patient: Malia Zacarias (89 y.o. male)  Date: 2/18/2019  Primary Diagnosis: Hematuria [R31.9]  Episodic lightheadedness [R42]       Precautions: Fall    ASSESSMENT AND RECOMMENDATIONS:  Patient is 79yo M admitted to hospital for hematuria and anemia and presents today alert and agreeable to therapy and was sitting in recliner upon arrival. Patient transferred to standing and ambulated 250ft in hallway. Patient was left sitting in locked recliner with call bell by his side. Patient denied further need for assist and encouraged to ambulate several times per day in hallway with nursing. Skilled physical therapy is not indicated at this time. Discharge Recommendations: Home Health  Further Equipment Recommendations for Discharge: N/A      Evaluation Complexity     Eval Complexity: History: MEDIUM  Complexity : 1-2 comorbidities / personal factors will impact the outcome/ POC Exam:LOW Complexity : 1-2 Standardized tests and measures addressing body structure, function, activity limitation and / or participation in recreation  Presentation: LOW Complexity : Stable, uncomplicated  Clinical Decision Making:Low Complexity   Overall Complexity:LOW     SUBJECTIVE:   Patient stated Jan Chu went through this before a couple years back and I was fine after it then, too.     OBJECTIVE DATA SUMMARY:     Past Medical History:   Diagnosis Date    Arthropathy, unspecified, other specified sites     Bone pain     BPH (benign prostatic hypertrophy)     BPH with obstruction/lower urinary tract symptoms     Cough     Fracture     right distal humerus     Gout     H/O seasonal allergies     Hypercholesterolemia     Hypertension     Obesity     Pain with swallowing     Retention of urine, unspecified     Right arm pain     SOB (shortness of breath)     Tattoo     Urinary retention      Past Surgical History:   Procedure Laterality Date    COLONOSCOPY N/A 12/2/2016    COLONOSCOPY performed by Santiago Travis MD at [address] HX ORTHOPAEDIC  12-    ORIF, right distal humerus fracture    HX OTHER SURGICAL      CYSTOSCOPY W PHOTOVAPORIZATION POST PROCEDURE ORDERSET 1    AK COLSC FLX W/NDSC US XM RCTM ET AL LMTD&ADJ STRUX       Barriers to Learning/Limitations: None  Compensate with: N/A  Prior Level of Function/Home Situation: Patient lives in ground level apartment and reports he was independent with mobility and I/ADL's PTA. Patient lives alone and has BSC, SC, and step over tub/shower. Patient has no other AD/DME. Home Situation  Home Environment: [address] Name: [address]  One/Two Story Residence: Other (Comment)(3 story building patient lives 1st floor)  # of Interior Steps: 0  Living Alone: Yes  Support Systems: Child(olga), Family member(s), Friends \ neighbors  Patient Expects to be Discharged to[de-identified] Assisted living  Current DME Used/Available at Home: None  Critical Behavior:    A&Ox4  Strength:    Strength: Within functional limits(BLE)   Tone & Sensation:   Tone: Normal(BLE)   Sensation: Intact(BLE)   Range Of Motion:  AROM: Within functional limits(BLE)    Functional Mobility:   Transfers:  Sit to Stand: Modified independent  Stand to Sit: Modified independent    Balance:   Sitting: Intact  Standing: Intact  Ambulation/Gait Training:  Distance (ft): 250 Feet (ft)  Assistive Device: (None)  Ambulation - Level of Assistance: Independent   Interventions: Verbal cues    Pain:  Pt reports 0/10 pain or discomfort prior to treatment.    Pt reports 0/10 pain or discomfort post treatment. Activity Tolerance:   Patient tolerated activity well and denied dizziness, SOB, or chest pain. Please refer to the flowsheet for vital signs taken during this treatment.   After treatment:   [x]         Patient left in no apparent distress sitting up in chair  [] Patient left in no apparent distress in bed  [x]         Call bell left within reach  [x]         Nursing notified [place] Lugo)  []         Caregiver present  []         Bed alarm activated  []         SCDs applied to B LE    COMMUNICATION/EDUCATION:   [x]         Fall prevention education was provided and the patient/caregiver indicated understanding. [x]         Patient/family have participated as able in goal setting and plan of care. [x]         Patient/family agree to work toward stated goals and plan of care. []         Patient understands intent and goals of therapy, but is neutral about his/her participation. []         Patient is unable to participate in goal setting and plan of care.     Thank you for this referral.  Jeannine Everett, PT   Time Calculation: 23 mins

## 2019-02-18 NOTE — ROUTINE PROCESS
Bedside and Verbal shift change report given to Nora Cruz RN (oncoming nurse) by Rodney Bravo RN (offgoing nurse). Report included the following information SBAR, Kardex, Intake/Output and Recent Results.

## 2019-02-18 NOTE — PROGRESS NOTES
Bedside and Verbal shift change report given to Martha Sosa RN (oncoming nurse) by Kyle Price RN (offgoing nurse). Report included the following information SBAR, Kardex and MAR.

## 2019-02-18 NOTE — ROUTINE PROCESS
Bedside shift change report given to Tiffany RN (oncoming nurse) by Ashlie Carias RN (offgoing nurse). Report included the following information SBAR, Kardex, Intake/Output, MAR, Recent Results and Quality Measures.

## 2019-02-18 NOTE — PROGRESS NOTES
Progress Note    Patient: Kimberly Ramirez MRN: [id number]  SSN: [id number]    YOB: 1935  Age: 80 y.o. Sex: male      Admit Date: 2/14/2019    LOS: 4 days     Assessment:   Gross hematuria  Possible bladder tumor    Plan:     CBI clamped. Plan for VT tomorrow. I'm arranging for cystoscopy to be performed as an outpatient. Subjective:     No issues. Has not required manual irrigation. Objective:     Vitals:    02/18/19 0736 02/18/19 0737 02/18/19 0738 02/18/19 1219   BP: 153/78 186/89 (!) 168/105 163/76   Pulse: 69   63   Resp: 16   16   Temp: 98.7 °F (37.1 °C)   99.3 °F (37.4 °C)   SpO2: 97%   95%   Weight:       Height:            Intake and Output:  Current Shift: 02/18 0701 - 02/18 1900  In: 240 [P.O.:240]  Out: 650 [Urine:650]  Last three shifts: 02/16 1901 - 02/18 0700  In: 79438.4 [P.O.:1240;  I.V.:4121.6]  Out: 19094 [Urine:94678]    Current Facility-Administered Medications   Medication Dose Route Frequency    amLODIPine (NORVASC) tablet 5 mg  5 mg Oral DAILY    metoprolol tartrate (LOPRESSOR) tablet 25 mg  25 mg Oral Q12H    0.9% sodium chloride infusion 250 mL  250 mL IntraVENous PRN    0.9% sodium chloride infusion 250 mL  250 mL IntraVENous PRN    piperacillin-tazobactam (ZOSYN) 3.375 g in 0.9% sodium chloride (MBP/ADV) 100 mL MBP  3.375 g IntraVENous Q6H    vancomycin (VANCOCIN) 1250 mg in  ml infusion  1,250 mg IntraVENous Q18H    dutasteride (AVODART) capsule 0.5 mg  0.5 mg Oral DAILY    acetaminophen (TYLENOL) tablet 650 mg  650 mg Oral Q4H PRN    ondansetron (ZOFRAN) injection 4 mg  4 mg IntraVENous Q4H PRN    insulin lispro (HUMALOG) injection   SubCUTAneous AC&HS    glucose chewable tablet 16 g  16 g Oral PRN    glucagon (GLUCAGEN) injection 1 mg  1 mg IntraMUSCular PRN    dextrose (D50W) injection syrg 12.5-25 g  25-50 mL IntraVENous PRN    hydrALAZINE (APRESOLINE) 20 mg/mL injection 10 mg  10 mg IntraVENous Q6H PRN         Physical Exam:   GENERAL: alert, cooperative, no distress, appears stated age  LUNG: unlabored breathing  ABDOMEN: soft, non-tender. EXTREMITIES:  extremities normal, atraumatic, no cyanosis or edema  SKIN: no jaundice  : urine clear on min irrigation. Lab/Data Review:  BMP:   Lab Results   Component Value Date/Time     02/18/2019 05:06 AM    K 3.2 (L) 02/18/2019 05:06 AM     (H) 02/18/2019 05:06 AM    CO2 24 02/18/2019 05:06 AM    AGAP 7 02/18/2019 05:06 AM     (H) 02/18/2019 05:06 AM    BUN 7 02/18/2019 05:06 AM    CREA 1.16 02/18/2019 05:06 AM    GFRAA >60 02/18/2019 05:06 AM    GFRNA >60 02/18/2019 05:06 AM     CBC:   Lab Results   Component Value Date/Time    WBC 9.5 02/18/2019 05:06 AM    HGB 8.2 (L) 02/18/2019 05:06 AM    HCT 25.1 (L) 02/18/2019 05:06 AM     02/18/2019 05:06 AM     COAGS: No results found for: APTT, PTP, INR       CT Results:  Results from Hospital Encounter encounter on 02/14/19   CT UROGRAM W WO CONT    Narrative EXAMINATION: CT urogram with and without contrast    INDICATION: Gross hematuria    COMPARISON: None    TECHNIQUE: CT of the abdomen and pelvis performed before and after 100 cc IV  Isovue 370, utilizing CT urogram protocol, with multiphase postcontrast  sequences as well as multiplanar and 3-D reformations. All CT scans at this  facility are performed using dose optimization technique as appropriate to a  performed exam, to include automated exposure control, adjustment of the mA  and/or kV according to patient size (including appropriate matching first site  specific examinations), or use of iterative reconstruction technique. FINDINGS:    CT UROGRAM:    Right kidney/ureter: A punctate right mid kidney nephrolith, without  hydronephrosis or suspicious parenchymal lesions. No suspicious right collecting  system filling defects. Left kidney/ureter: No hydronephrosis and no definite urolithiasis. No  suspicious parenchymal lesions. Evidence of a bifid ureter.  No suspicious left  collecting system filling defects. Bladder: Incompletely distended with Ortega catheter in place, with heterogeneous  contents including some dense contrast within bladder. Heterogeneous mass along  base of bladder is likely partly due to extension of enlarged prostate. However  there is also more exuberant wall thickening along the posterior bladder wall. NONUROGRAPHIC:    Lower thorax: Moderate to large hiatal hernia. Hepatobiliary: Liver normal. Gallbladder unremarkable. No biliary duct  dilatation. Pancreas: Normal.    Spleen: Normal.    Adrenal glands: Normal.    Genitourinary: See above. Markedly enlarged heterogeneous prostate gland, with  evidence of median lobe hypertrophy eccentric to the left. Gastrointestinal: Moderate to large hiatal hernia. Small bowel loops are  nondilated. The colon is nondilated. Appendix normal.    Mesentery/vessels/nodes: No free air or free fluid. Extensive atherosclerotic  calcifications. Major vessels unremarkable. No adenopathy by size criteria. Miscellaneous: Superficial soft tissues unremarkable. Bones: Multilevel advanced lumbar degenerative changes. No acute osseous  findings. Impression IMPRESSION:    CT UROGRAM:    Mass at base of bladder likely at least partly due to extension of a  heterogeneously enlarged prostate gland, however there is also more exuberant  posterior bladder wall thickening. Findings are worrisome for bladder mass. Bladder however is not optimally evaluated due to suboptimal distention in the  setting of catheterization. Further investigation recommended.  -Heterogeneous bladder lumen contents including contrast, may represent mixing  of nonopacified urine. Recommend correlation with urinalysis. Punctate right kidney nephrolith. No hydronephrosis. Bifid left ureter noted. NONUROGRAPHIC:    Moderate to large hiatal hernia. Please see urogram findings above.        US Results:  Results from Abstract encounter on 10/01/12   US RETROPERITONEAL         Active Problems:    Hematuria (2/14/2019)      Episodic lightheadedness (2/14/2019)          Signed By: Nohemi Cooper MD , FACS    February 18, 2019      PAGER:  [phone]  OFFICE:  [phone]

## 2019-02-18 NOTE — PROGRESS NOTES
[facility] Hospitalist Group  Progress Note    Patient: Adrianna Manual Age: 80 y.o. : 1935 MR#: [id number] SSN: [id number]  Date: 2019     Subjective:     pain free, no other complaints including no dizziness, lightheadedness, SOB or CP    Assessment/Plan:   1. Hematuria - CBI clamped; plan for voiding trial tomorrow and cystoscopy as OP  2. UTI - per U/A, urine culture no growth; cont zosyn d/c vanco  3. Hypertension - hypotension in setting of gross hematuria now resolved; resume norvasc and metoprolol  4. Anemia, normocytic -improvType 2 DM - A1c 6.8; cont SSI  5. Hypokalemia - replaced  6. Gout - no current evidence of flare  7. BPH - cont avodart / flomax; follow renal function  8.  Dispo - await PT/OT input, dc soon    Additional Notes:      Case discussed with:  [x]Patient  [x]Family  [x]Nursing  []Case Management  DVT Prophylaxis:  []Lovenox  []Hep SQ  [x]SCDs  []Coumadin   []On Heparin gtt    Objective:   VS:   Visit Vitals  BP (!) 168/105 (BP 1 Location: Left arm, BP Patient Position: Standing)   Pulse 69   Temp 98.7 °F (37.1 °C)   Resp 16   Ht 5' 9\" (1.753 m)   Wt 90.1 kg (198 lb 11.2 oz)   SpO2 97%   BMI 29.34 kg/m²      Tmax/24hrs: Temp (24hrs), Av.3 °F (36.8 °C), Min:96.8 °F (36 °C), Max:99.1 °F (37.3 °C)      Intake/Output Summary (Last 24 hours) at 2019 1022  Last data filed at 2019 0935  Gross per 24 hour   Intake 99593.8 ml   Output 13071 ml   Net -2292.2 ml     General:  Alert, NAD  Cardiovascular:  RRR  Pulmonary:  LSC throughout; respiratory effort WNL  GI:  +BS in all four quadrants, soft, non-tender  : CBI; improved  Extremities:  No edema; 2+ dorsalis pedis pulses bilaterally  Neuro: oriented x 3    Daughter Jennifer Rounds [phone]    Labs:    Recent Results (from the past 24 hour(s))   GLUCOSE, POC    Collection Time: 19 11:04 AM   Result Value Ref Range    Glucose (POC) 239 (H) 70 - 110 mg/dL   HGB & HCT    Collection Time: 19  2:05 PM Result Value Ref Range    HGB 7.7 (L) 13.0 - 16.0 g/dL    HCT 23.4 (L) 36.0 - 48.0 %   GLUCOSE, POC    Collection Time: 02/17/19  3:51 PM   Result Value Ref Range    Glucose (POC) 130 (H) 70 - 110 mg/dL   GLUCOSE, POC    Collection Time: 02/18/19 12:04 AM   Result Value Ref Range    Glucose (POC) 127 (H) 70 - 110 mg/dL   VANCOMYCIN, TROUGH    Collection Time: 02/18/19  5:00 AM   Result Value Ref Range    Vancomycin,trough 7.4 (L) 10.0 - 20.0 ug/mL    Reported dose date: [id number]      Reported dose time: 1200      Reported dose: 1250 MG UNITS   METABOLIC PANEL, BASIC    Collection Time: 02/18/19  5:06 AM   Result Value Ref Range    Sodium 140 136 - 145 mmol/L    Potassium 3.2 (L) 3.5 - 5.5 mmol/L    Chloride 109 (H) 100 - 108 mmol/L    CO2 24 21 - 32 mmol/L    Anion gap 7 3.0 - 18 mmol/L    Glucose 148 (H) 74 - 99 mg/dL    BUN 7 7.0 - 18 MG/DL    Creatinine 1.16 0.6 - 1.3 MG/DL    BUN/Creatinine ratio 6 (L) 12 - 20      GFR est AA >60 >60 ml/min/1.73m2    GFR est non-AA >60 >60 ml/min/1.73m2    Calcium 8.2 (L) 8.5 - 10.1 MG/DL   CBC WITH AUTOMATED DIFF    Collection Time: 02/18/19  5:06 AM   Result Value Ref Range    WBC 9.5 4.6 - 13.2 K/uL    RBC 2.94 (L) 4.70 - 5.50 M/uL    HGB 8.2 (L) 13.0 - 16.0 g/dL    HCT 25.1 (L) 36.0 - 48.0 %    MCV 85.4 74.0 - 97.0 FL    MCH 27.9 24.0 - 34.0 PG    MCHC 32.7 31.0 - 37.0 g/dL    RDW 13.6 11.6 - 14.5 %    PLATELET 566 597 - 940 K/uL    MPV 10.2 9.2 - 11.8 FL    NEUTROPHILS 74 (H) 40 - 73 %    LYMPHOCYTES 15 (L) 21 - 52 %    MONOCYTES 8 3 - 10 %    EOSINOPHILS 3 0 - 5 %    BASOPHILS 0 0 - 2 %    ABS. NEUTROPHILS 7.1 1.8 - 8.0 K/UL    ABS. LYMPHOCYTES 1.4 0.9 - 3.6 K/UL    ABS. MONOCYTES 0.7 0.05 - 1.2 K/UL    ABS. EOSINOPHILS 0.3 0.0 - 0.4 K/UL    ABS.  BASOPHILS 0.0 0.0 - 0.1 K/UL    DF AUTOMATED     MAGNESIUM    Collection Time: 02/18/19  5:06 AM   Result Value Ref Range    Magnesium 2.0 1.6 - 2.6 mg/dL     Signed By: Halima Olivas NP     February 18, 2019

## 2019-02-18 NOTE — PROGRESS NOTES
Problem: Self Care Deficits Care Plan (Adult)  Goal: *Acute Goals and Plan of Care (Insert Text)  Outcome: Resolved/Met Date Met: 02/18/19  Occupational Therapy EVALUATION/discharge    Patient: Germaine Holm (11 y.o. male)  Date: 2/18/2019  Primary Diagnosis: Hematuria [R31.9]  Episodic lightheadedness [R42]       Precautions:   Fall    ASSESSMENT AND RECOMMENDATIONS:  Based on the objective data described below, the patient is able to perform basic self care tasks and functional transfers without assistance. Patient lives alone but has needed DME for bathroom safety. He was educated on energy conservation techniques and fall safety with intermittent dizziness. Patient verbalized understanding. Skilled occupational therapy is not indicated at this time. Discharge Recommendations: None  Further Equipment Recommendations for Discharge: N/A      Barriers to Learning/Limitations: None  Compensate with: visual, verbal, tactile, kinesthetic cues/model     COMPLEXITY     Eval Complexity: History: LOW Complexity : Brief history review ; Examination: LOW Complexity : 1-3 performance deficits relating to physical, cognitive , or psychosocial skils that result in activity limitations and / or participation restrictions ; Decision Making:LOW Complexity : No comorbidities that affect functional and no verbal or physical assistance needed to complete eval tasks  Assessment: Low Complexity     SUBJECTIVE:   Patient stated I don't walk with anything.     OBJECTIVE DATA SUMMARY:     Past Medical History:   Diagnosis Date    Arthropathy, unspecified, other specified sites     Bone pain     BPH (benign prostatic hypertrophy)     BPH with obstruction/lower urinary tract symptoms     Cough     Fracture     right distal humerus     Gout     H/O seasonal allergies     Hypercholesterolemia     Hypertension     Obesity     Pain with swallowing     Retention of urine, unspecified     Right arm pain     SOB (shortness of breath)    Eduarda Jeyson Urinary retention      Past Surgical History:   Procedure Laterality Date    COLONOSCOPY N/A 12/2/2016    COLONOSCOPY performed by Ashleigh Jones MD at [address] [facility]  12-    ORIF, right distal humerus fracture    HX OTHER SURGICAL      CYSTOSCOPY W PHOTOVAPORIZATION POST PROCEDURE ORDERSET 1    CA COLSC FLX W/NDSC US XM RCTM ET AL LMTD&ADJ STRUX       Prior Level of Function/Home Situation: Pt was modified independent with basic self care tasks and functional mobility PTA. Home Situation  [address]  One/Two Story Residence: Other (Comment)(3 story building patient lives 1st floor)  # of Interior Steps: 0  Living Alone: Yes  Support Systems: Child(olga), Family member(s), Friends \ neighbors  Patient Expects to be Discharged to[de-identified] Assisted living  Current DME Used/Available at Home: None  Tub or Shower Type: Tub/Shower combination(with grab bar)  [x]     Right hand dominant   []     Left hand dominant  Cognitive/Behavioral Status:  Neurologic State: Alert  Orientation Level: Oriented X4  Cognition: Appropriate decision making; Follows commands  Safety/Judgement: Awareness of environment; Fall prevention    Skin: Intact on UEs    Edema: None noted in UEs    Vision/Perceptual:     Acuity: Within Defined Limits    Corrective Lenses: Glasses    Coordination:  Coordination: Within functional limits(BLE)  Fine Motor Skills-Upper: Left Intact; Right Intact    Gross Motor Skills-Upper: Left Intact; Right Intact    Balance:  Sitting: Intact  Standing: Intact    Strength:  Strength:  Within functional limits(UEs)    Tone & Sensation:  Tone: Normal(UEs)  Sensation: Intact(UEs)    Range of Motion:  AROM: Within functional limits(UEs)    Functional Mobility and Transfers for ADLs:  Bed Mobility:  Supine to Sit: Modified independent  Sit to Supine: Modified independent  Transfers:  Sit to Stand: Modified independent   Toilet Transfer : Modified independent    ADL Assessment:  Feeding: Independent    Oral Facial Hygiene/Grooming: Modified Independent    Bathing: Modified independent    Upper Body Dressing: Modified independent    Lower Body Dressing: Modified independent    Toileting: Modified independent     Pain:  Pt reports 0/10 pain or discomfort prior to treatment.    Pt reports 0/10 pain or discomfort post treatment. Activity Tolerance:   Good    Please refer to the flowsheet for vital signs taken during this treatment. After treatment:   []  Patient left in no apparent distress sitting up in chair  [x]  Patient left in no apparent distress in bed  [x]  Call bell left within reach  [x]  Nursing notified  []  Caregiver present  []  Bed alarm activated    COMMUNICATION/EDUCATION:   Communication/Collaboration:  [x]      Home safety education was provided and the patient/caregiver indicated understanding. [x]      Patient/family have participated as able and agree with findings and recommendations. []      Patient is unable to participate in plan of care at this time.     Lissett Mehta MS OTR/L  Time Calculation: 15 mins

## 2019-02-19 LAB
ANION GAP SERPL CALC-SCNC: 8 MMOL/L (ref 3–18)
BASOPHILS # BLD: 0 K/UL (ref 0–0.1)
BASOPHILS NFR BLD: 0 % (ref 0–2)
BUN SERPL-MCNC: 7 MG/DL (ref 7–18)
BUN/CREAT SERPL: 7 (ref 12–20)
CALCIUM SERPL-MCNC: 8.1 MG/DL (ref 8.5–10.1)
CHLORIDE SERPL-SCNC: 111 MMOL/L (ref 100–108)
CO2 SERPL-SCNC: 23 MMOL/L (ref 21–32)
CREAT SERPL-MCNC: 1.04 MG/DL (ref 0.6–1.3)
DIFFERENTIAL METHOD BLD: ABNORMAL
EOSINOPHIL # BLD: 0.4 K/UL (ref 0–0.4)
EOSINOPHIL NFR BLD: 6 % (ref 0–5)
ERYTHROCYTE [DISTWIDTH] IN BLOOD BY AUTOMATED COUNT: 13.7 % (ref 11.6–14.5)
GLUCOSE BLD STRIP.AUTO-MCNC: 124 MG/DL (ref 70–110)
GLUCOSE BLD STRIP.AUTO-MCNC: 135 MG/DL (ref 70–110)
GLUCOSE BLD STRIP.AUTO-MCNC: 149 MG/DL (ref 70–110)
GLUCOSE BLD STRIP.AUTO-MCNC: 149 MG/DL (ref 70–110)
GLUCOSE SERPL-MCNC: 126 MG/DL (ref 74–99)
HCT VFR BLD AUTO: 24.1 % (ref 36–48)
HGB BLD-MCNC: 7.7 G/DL (ref 13–16)
LYMPHOCYTES # BLD: 1.4 K/UL (ref 0.9–3.6)
LYMPHOCYTES NFR BLD: 22 % (ref 21–52)
MCH RBC QN AUTO: 27.4 PG (ref 24–34)
MCHC RBC AUTO-ENTMCNC: 32 G/DL (ref 31–37)
MCV RBC AUTO: 85.8 FL (ref 74–97)
MONOCYTES # BLD: 0.5 K/UL (ref 0.05–1.2)
MONOCYTES NFR BLD: 8 % (ref 3–10)
NEUTS SEG # BLD: 4 K/UL (ref 1.8–8)
NEUTS SEG NFR BLD: 64 % (ref 40–73)
PLATELET # BLD AUTO: 166 K/UL (ref 135–420)
PMV BLD AUTO: 10.5 FL (ref 9.2–11.8)
POTASSIUM SERPL-SCNC: 3.2 MMOL/L (ref 3.5–5.5)
RBC # BLD AUTO: 2.81 M/UL (ref 4.7–5.5)
SODIUM SERPL-SCNC: 142 MMOL/L (ref 136–145)
WBC # BLD AUTO: 6.3 K/UL (ref 4.6–13.2)

## 2019-02-19 PROCEDURE — 74011250636 HC RX REV CODE- 250/636: Performed by: INTERNAL MEDICINE

## 2019-02-19 PROCEDURE — 74011250637 HC RX REV CODE- 250/637: Performed by: INTERNAL MEDICINE

## 2019-02-19 PROCEDURE — 74011250637 HC RX REV CODE- 250/637: Performed by: UROLOGY

## 2019-02-19 PROCEDURE — 74011000258 HC RX REV CODE- 258: Performed by: INTERNAL MEDICINE

## 2019-02-19 PROCEDURE — 65660000000 HC RM CCU STEPDOWN

## 2019-02-19 PROCEDURE — 82962 GLUCOSE BLOOD TEST: CPT

## 2019-02-19 PROCEDURE — 80048 BASIC METABOLIC PNL TOTAL CA: CPT

## 2019-02-19 PROCEDURE — 74011250637 HC RX REV CODE- 250/637: Performed by: EMERGENCY MEDICINE

## 2019-02-19 PROCEDURE — 85025 COMPLETE CBC W/AUTO DIFF WBC: CPT

## 2019-02-19 PROCEDURE — 36415 COLL VENOUS BLD VENIPUNCTURE: CPT

## 2019-02-19 PROCEDURE — 74011250637 HC RX REV CODE- 250/637: Performed by: PHYSICIAN ASSISTANT

## 2019-02-19 RX ORDER — TAMSULOSIN HYDROCHLORIDE 0.4 MG/1
0.4 CAPSULE ORAL DAILY
Status: DISCONTINUED | OUTPATIENT
Start: 2019-02-19 | End: 2019-02-20 | Stop reason: HOSPADM

## 2019-02-19 RX ORDER — POTASSIUM CHLORIDE 20 MEQ/1
40 TABLET, EXTENDED RELEASE ORAL
Status: COMPLETED | OUTPATIENT
Start: 2019-02-19 | End: 2019-02-19

## 2019-02-19 RX ADMIN — ACETAMINOPHEN 650 MG: 325 TABLET ORAL at 16:33

## 2019-02-19 RX ADMIN — METOPROLOL TARTRATE 25 MG: 25 TABLET ORAL at 09:11

## 2019-02-19 RX ADMIN — PIPERACILLIN SODIUM,TAZOBACTAM SODIUM 3.38 G: 3; .375 INJECTION, POWDER, FOR SOLUTION INTRAVENOUS at 09:11

## 2019-02-19 RX ADMIN — DUTASTERIDE 0.5 MG: 0.5 CAPSULE, LIQUID FILLED ORAL at 09:11

## 2019-02-19 RX ADMIN — TAMSULOSIN HYDROCHLORIDE 0.4 MG: 0.4 CAPSULE ORAL at 09:11

## 2019-02-19 RX ADMIN — PIPERACILLIN SODIUM,TAZOBACTAM SODIUM 3.38 G: 3; .375 INJECTION, POWDER, FOR SOLUTION INTRAVENOUS at 00:14

## 2019-02-19 RX ADMIN — METOPROLOL TARTRATE 25 MG: 25 TABLET ORAL at 21:09

## 2019-02-19 RX ADMIN — POTASSIUM CHLORIDE 40 MEQ: 20 TABLET, EXTENDED RELEASE ORAL at 16:04

## 2019-02-19 RX ADMIN — ACETAMINOPHEN 650 MG: 325 TABLET ORAL at 00:20

## 2019-02-19 RX ADMIN — PIPERACILLIN SODIUM,TAZOBACTAM SODIUM 3.38 G: 3; .375 INJECTION, POWDER, FOR SOLUTION INTRAVENOUS at 04:30

## 2019-02-19 RX ADMIN — AMLODIPINE BESYLATE 5 MG: 5 TABLET ORAL at 09:11

## 2019-02-19 RX ADMIN — ACETAMINOPHEN 650 MG: 325 TABLET ORAL at 09:11

## 2019-02-19 NOTE — DIABETES MGMT
Glycemic Control Plan of Care    T2DM with current A1c of 6.8% (2/15/2019). I was able to contact the patient's daughter, Lucas Chamberlain, at [phone]. She is employed as a home health nurse. She is planning to assist her father with his diabetes management including home blood glucose monitoring. I left a BG meter sample, Contour, at the bedside with 20 lancets and 20 test strips. Regina will get a prescription from the doctor for a permanent meter and testing supplies. Regina took the diabetes class sched which I left at the bedside last week and stated that patient and other family members are planning to attend classes. Home diabetes medications: Verified home diabetes medication by calling the patient's PCP office of Dr. Vee Rome and obtained the following:  Metformin 500 mg twice daily. I was able to verify the Metformin when I called his daughter, Lucas Chamberlain, on 2/19/2019. POC BG range on 2/18/2019: 127-146 mg/dL. POC BG report on 2/19/2019 at time of review: 149, 149 mg/dL. Recommendation(s):  1.) Continue inpatient glycemic monitoring and correctional lispro insulin as ordered. 2.) Consider resuming Metformin 500 mg BID at discharge. Assessment:  Patient is 80year old with past medical history including type 2 diabetes mellitus, hypertension, hypercholesterolemia,  BPH, former smoker, and seizures - was admitted on 2/12/2019 with report that he was sent from his PCP office to ED because of GI bleed onset and report of lightheadedness. Noted:  Doncharu Hirbrain hematuria. T2DM with current A1c of 6.8% (2/15/2019). Most recent blood glucose values:    Results for Adarsh Raygoza (MRN [id number]) as of 2/19/2019 15:56   Ref. Range 2/18/2019 00:04 2/18/2019 12:18 2/18/2019 17:23 2/18/2019 21:33   GLUCOSE,FAST - POC Latest Ref Range: 70 - 110 mg/dL 127 (H) 146 (H) 141 (H) 140 (H)     Results for Adarsh Raygoza (MRN [id number]) as of 2/19/2019 15:56   Ref.  Range 2/19/2019 07:49 2/19/2019 11:27   GLUCOSE,FAST - POC Latest Ref Range: 70 - 110 mg/dL 149 (H) 149 (H)     Current A1C: 6.8% (2/15/2019) which is equivalent to estimated average blood glucose of 148 mg/dL during the past 2-3 months. Current hospital diabetes medications:  Correctional lispro insulin ACHS. Normal sensitivity dose. Total daily dose insulin requirement previous day: 2/18/2019:  None. Home diabetes medications: Verified home diabetes medication by calling the patient's PCP office of Dr. Nestor Bryant and obtained the following:  Metformin 500 mg twice daily    Diet: Dysphagia advanced soft; consistent carb 1800kcal.    Goals:  Blood glucose will be within target range of  mg/dL by 02/22/2019. Education:  2/19/2019: Daughter Yovani Bernal stated that she will assist her father at home for his diabetes management - and they also plan to attend the diabetes classes at [facility] along with other family members.   ___  Refer to Diabetes Education Record  ___  Education not indicated at this time    Sherron Dash RN [facility]  Pager: [phone]

## 2019-02-19 NOTE — PROGRESS NOTES
conducted a Follow up consultation and Spiritual Assessment for Nikko Fernandez, who is a 80 y. o.,male. The  provided the following Interventions:  Continued the relationship of care and support. Listened empathically. Offered prayer and assurance of continued prayer on patients behalf. Chart reviewed. The following outcomes were achieved:  Patient expressed gratitude for 's visit. Assessment:  There are no further spiritual or Buddhism issues which require Spiritual Care Services interventions at this time. Plan:  Chaplains will continue to follow and will provide pastoral care on an as needed/requested basis.  recommends bedside caregivers page  on duty if patient shows signs of acute spiritual or emotional distress.        [id number] [facility]   [phone]

## 2019-02-19 NOTE — PROGRESS NOTES
[facility] Hospitalist Group  Progress Note    Patient: Dorothy Johnson Age: 80 y.o. : 1935 MR#: [id number] SSN: [id number]  Date: 2019     Subjective:     Pt reports doing okay. No chest pain, SOB, abd pain, fevers, chills. D/w daughter Elspeth Eisenmenger - [phone] about updates and plan for discharge in the am. She agrees with plan. Assessment/Plan:   1. Hematuria - s/p CBI per urology, resolved. Monitor PVR. 2. UTI - per U/A, urine culture negative. f Stop Zosyn. 3. Hypertension - w/ episode of hypotension - resolved, back on antihypertensives  4. Anemia, normocytic - in setting of #1, s/p 2 units of PRBCs. 5. Type 2 DM - A1c 6.8; cont SSI   6. Hypokalemia - replace, recheck in the am.   7. Gout - no current evidence of flare  8. BPH - cont avodart / flomax; follow renal function  9. Mass at base of bladder: CT urogram, plan for cystoscopy as OP with Urology.      Goals of care: full code  Disposition:  [x]PT/OT ordered   [x] Case management referral    Case discussed with:  [x]Patient  []Family  [x]Nursing  []Case Management  DVT Prophylaxis:  []Lovenox  []Hep SQ  [x]SCDs  []Coumadin   []On Heparin gtt    Objective:   VS:   Visit Vitals  /57 (BP 1 Location: Left arm, BP Patient Position: At rest)   Pulse 61   Temp 98.8 °F (37.1 °C)   Resp 18   Ht 5' 9\" (1.753 m)   Wt 90.1 kg (198 lb 11.2 oz)   SpO2 96%   BMI 29.34 kg/m²      Tmax/24hrs: Temp (24hrs), Av.7 °F (37.1 °C), Min:97.3 °F (36.3 °C), Max:99.6 °F (37.6 °C)      Intake/Output Summary (Last 24 hours) at 2019 1530  Last data filed at 2019 1436  Gross per 24 hour   Intake 1600 ml   Output 3050 ml   Net -1450 ml       General:  Awake, alert, NAD  Cardiovascular:  RRR  Pulmonary: CTA   GI:  NT, normal BS  Extremities:  No edema or cyanosis  Neuro: AAOx3     Labs:    Recent Results (from the past 24 hour(s))   GLUCOSE, POC    Collection Time: 19  5:23 PM   Result Value Ref Range    Glucose (POC) 141 (H) 70 - 110 mg/dL   GLUCOSE, POC    Collection Time: 02/18/19  9:33 PM   Result Value Ref Range    Glucose (POC) 140 (H) 70 - 223 mg/dL   METABOLIC PANEL, BASIC    Collection Time: 02/19/19  2:36 AM   Result Value Ref Range    Sodium 142 136 - 145 mmol/L    Potassium 3.2 (L) 3.5 - 5.5 mmol/L    Chloride 111 (H) 100 - 108 mmol/L    CO2 23 21 - 32 mmol/L    Anion gap 8 3.0 - 18 mmol/L    Glucose 126 (H) 74 - 99 mg/dL    BUN 7 7.0 - 18 MG/DL    Creatinine 1.04 0.6 - 1.3 MG/DL    BUN/Creatinine ratio 7 (L) 12 - 20      GFR est AA >60 >60 ml/min/1.73m2    GFR est non-AA >60 >60 ml/min/1.73m2    Calcium 8.1 (L) 8.5 - 10.1 MG/DL   CBC WITH AUTOMATED DIFF    Collection Time: 02/19/19  2:36 AM   Result Value Ref Range    WBC 6.3 4.6 - 13.2 K/uL    RBC 2.81 (L) 4.70 - 5.50 M/uL    HGB 7.7 (L) 13.0 - 16.0 g/dL    HCT 24.1 (L) 36.0 - 48.0 %    MCV 85.8 74.0 - 97.0 FL    MCH 27.4 24.0 - 34.0 PG    MCHC 32.0 31.0 - 37.0 g/dL    RDW 13.7 11.6 - 14.5 %    PLATELET 282 625 - 483 K/uL    MPV 10.5 9.2 - 11.8 FL    NEUTROPHILS 64 40 - 73 %    LYMPHOCYTES 22 21 - 52 %    MONOCYTES 8 3 - 10 %    EOSINOPHILS 6 (H) 0 - 5 %    BASOPHILS 0 0 - 2 %    ABS. NEUTROPHILS 4.0 1.8 - 8.0 K/UL    ABS. LYMPHOCYTES 1.4 0.9 - 3.6 K/UL    ABS. MONOCYTES 0.5 0.05 - 1.2 K/UL    ABS. EOSINOPHILS 0.4 0.0 - 0.4 K/UL    ABS.  BASOPHILS 0.0 0.0 - 0.1 K/UL    DF AUTOMATED     GLUCOSE, POC    Collection Time: 02/19/19  7:49 AM   Result Value Ref Range    Glucose (POC) 149 (H) 70 - 110 mg/dL   GLUCOSE, POC    Collection Time: 02/19/19 11:27 AM   Result Value Ref Range    Glucose (POC) 149 (H) 70 - 110 mg/dL       Signed By: Ermias Nava PA-C     February 19, 2019 3:30 PM

## 2019-02-19 NOTE — ROUTINE PROCESS
Bedside and Verbal shift change report given to Beka (oncoming nurse) by Anita Payton RN   (offgoing nurse). Report included the following information SBAR, Kardex, Intake/Output, MAR and Recent Results.

## 2019-02-19 NOTE — PROGRESS NOTES
1345-  Patient voided 150 ml urine. Post void scan 0 ml residual.  Patient denies pain/discomfort,one large clot noted in urinal.      1435-  Patient voided 250 ml urine. Post void residual 0 ml. Patient denies pain/discomfort. urine clearing,no clots. 1610-  Patient voided 750 ml urine. Post void residual 0 ml. Patient denies pain/discomfort. Efrain Nichole

## 2019-02-19 NOTE — PROGRESS NOTES
[address], [address]                                                      Phone: [phone]  Urology Daily Progress Note    Patient Active Problem List   Diagnosis Code    Right arm pain M79.601    Hypertension I10    Hypercholesterolemia E78.00    Retention of urine, unspecified R33.9    BPH with obstruction/lower urinary tract symptoms N40.1, N13.8    H/O seasonal allergies Z88.9    Urinary retention R33.9    Arthropathy, unspecified, other specified sites M12.9    Tattoo L81.8    Pain with swallowing R13.10    SOB (shortness of breath) R06.02    Cough R05    Bone pain M89.8X9    Seizures (HCC) R56.9    Hematuria R31.9    Episodic lightheadedness R42         Assessment & Plan     Assessment  Gross hematuria - resolved  ? UTI by UA. Ucx 10K mixed, Repeat Ucx neg, Bcx. NGTD. On Zosyn  Possible bladder tumor        Plan:  Urine remains clear off CBI  VT today. Monitor PVR's  Nursing to replace manning if PVR > 250. Ok to d/c home with manning if fails VT and will repeat as outpt  Continue Avodart. Start Flomax and continue as outpt  Follow up arranged: YES per Dr. Leeanna Burnham note arrangements made for cystoscopy to be performed as an outpatient. Dhara Coyne [facility]  [facility] [place]  Pager # [phone]    Available M-F 7:30- 5pm .  After 5pm and weekends please call answering service at [phone]     I have seen and examined this patient independently, I reviewed pertinent labs and imaging, and I agree with the assessing provider's assessment and plan as outlined above, with ammendments as follows:     Voiding well  Urine pink  Follow up as outpatient with Cystoscopy      Selena Garcia MD  [facility] [place] [place]  Pager [phone]      Subjective   Reports suprapubic discomfort, no hematuria overnight and urine clear today        Objective       PHYSICAL EXAMINATION:   Visit Vitals  BP (!) 142/97 (BP 1 Location: Left arm, BP Patient Position: At rest)   Pulse 65   Temp 97.3 °F (36.3 °C)   Resp 18   Ht 5' 9\" (1.753 m)   Wt 198 lb 11.2 oz (90.1 kg)   SpO2 91%   BMI 29.34 kg/m²       Constitutional: Well developed, well nourished male. No acute distress. HEENT: Normocephalic, Atraumatic   CV:  RRR   Pulm: No respiratory distress or difficulties breathing   GI:  No abdominal masses or tenderness. [de-identified]   CVA tenderness: None         SCROTUM:  Normal         PENIS: Normal   Ortega: Draining clear yellow urine off CBI   Skin: No evidence of jaundice. Normal color  Neuro/Psych:  Alert and oriented. Affect appropriate. Lymphatic:   No enlarged supraclavicular lymphnodes  MSK: FROM         REVIEW OF LABS AND IMAGING:        Labs:     Labs: Results:   Chemistry    Recent Labs     02/19/19  0236 02/18/19  0506 02/17/19  0444   * 148* 114*    140 141   K 3.2* 3.2* 3.5   * 109* 113*   CO2 23 24 23   BUN 7 7 11   CREA 1.04 1.16 1.04   CA 8.1* 8.2* 7.7*   AGAP 8 7 5   BUCR 7* 6* 11*      CBC w/Diff Recent Labs     02/19/19  0236 02/18/19  0506 02/17/19  1405 02/17/19  0444   WBC 6.3 9.5  --  7.7   RBC 2.81* 2.94*  --  2.52*   HGB 7.7* 8.2* 7.7* 6.8*   HCT 24.1* 25.1* 23.4* 21.3*    141  --  138   GRANS 64 74*  --  68   LYMPH 22 15*  --  22   EOS 6* 3  --  4      Cultures No results for input(s): CULT in the last 72 hours.   All Micro Results     Procedure Component Value Units Date/Time    CULTURE, BLOOD [[id number]] Collected:  02/14/19 1630    Order Status:  Completed Specimen:  Whole Blood Updated:  02/19/19 0709     Special Requests: NO SPECIAL REQUESTS        Culture result: NO GROWTH 5 DAYS       CULTURE, BLOOD [[id number]] Collected:  02/14/19 1615    Order Status:  Completed Specimen:  Whole Blood Updated:  02/19/19 0709     Special Requests: NO SPECIAL REQUESTS        Culture result: NO GROWTH 5 DAYS       CULTURE, URINE [[id number]] Collected:  02/14/19 2248    Order Status:  Completed Specimen:  Urine from Clean catch Updated:  02/16/19 0929     Special Requests: NO SPECIAL REQUESTS        Culture result: NO GROWTH 2 DAYS       CULTURE, URINE [[id number]] Collected:  02/14/19 1236    Order Status:  Completed Specimen:  Clean catch Updated:  02/15/19 1230     Special Requests: NO SPECIAL REQUESTS        Culture result:       <10,000  COLONIES/mL  MIXED GRAM POSITIVE MADHU, PROBABLE SKIN/GENITAL CONTAMINATION. Urinalysis Color   Date Value Ref Range Status   02/14/2019 RED   Final     Appearance   Date Value Ref Range Status   02/14/2019 BLOODY   Final     Specific gravity   Date Value Ref Range Status   02/14/2019 1.010 1.003 - 1.030   Final     pH (UA)   Date Value Ref Range Status   02/14/2019 8.5 (H) 5.0 - 8.0   Final     Protein   Date Value Ref Range Status   02/14/2019 >300 (A) NEG mg/dL Final     Ketone   Date Value Ref Range Status   02/14/2019 40 (A) NEG mg/dL Final     Bilirubin   Date Value Ref Range Status   02/14/2019 LARGE (A) NEG   Final     Blood   Date Value Ref Range Status   02/14/2019 LARGE (A) NEG   Final     Urobilinogen   Date Value Ref Range Status   02/14/2019 4.0 (H) 0.2 - 1.0 EU/dL Final     Nitrites   Date Value Ref Range Status   02/14/2019 NEGATIVE  NEG   Final     Leukocyte Esterase   Date Value Ref Range Status   02/14/2019 LARGE (A) NEG   Final     Potassium   Date Value Ref Range Status   02/19/2019 3.2 (L) 3.5 - 5.5 mmol/L Final     Creatinine   Date Value Ref Range Status   02/19/2019 1.04 0.6 - 1.3 MG/DL Final     BUN   Date Value Ref Range Status   02/19/2019 7 7.0 - 18 MG/DL Final     Prostate Specific Ag   Date Value Ref Range Status   03/26/2012 10.9  Final      PSA No results for input(s): PSA in the last 72 hours.    Coagulation Lab Results   Component Value Date/Time    Prothrombin time 13.2 02/14/2019 11:45 AM    Prothrombin time 13.6 12/30/2010 12:32 PM    INR 1.0 02/14/2019 11:45 AM INR 1.1 12/30/2010 12:32 PM    aPTT 22.6 (L) 12/30/2010 12:32 PM    aPTT 25.4 12/30/2010 09:14 AM

## 2019-02-19 NOTE — PROGRESS NOTES
Shift progress Note:  Assumed care of patient in bed awake & alert. Ortega remains to gravity, CBI remains clamped off. Scant amount of tiny blood flecks notable in urine in the am. No C/O pain, no s/s of distress. Uneventful night. VSS, mike bell within reach. Patient Vitals for the past 12 hrs:   Temp Pulse Resp BP SpO2   02/19/19 0400 97.3 °F (36.3 °C) 65 18 (!) 142/97 91 %   02/19/19 0000 98.5 °F (36.9 °C) (!) 58 18 167/79 97 %   02/18/19 2013 99.1 °F (37.3 °C) 60 18 155/85 96 %     .

## 2019-02-20 VITALS
RESPIRATION RATE: 18 BRPM | BODY MASS INDEX: 29.22 KG/M2 | OXYGEN SATURATION: 96 % | SYSTOLIC BLOOD PRESSURE: 161 MMHG | DIASTOLIC BLOOD PRESSURE: 73 MMHG | HEART RATE: 53 BPM | HEIGHT: 69 IN | TEMPERATURE: 98.7 F | WEIGHT: 197.3 LBS

## 2019-02-20 LAB
BACTERIA SPEC CULT: NORMAL
BACTERIA SPEC CULT: NORMAL
FERRITIN SERPL-MCNC: 55 NG/ML (ref 8–388)
GLUCOSE BLD STRIP.AUTO-MCNC: 115 MG/DL (ref 70–110)
IRON SATN MFR SERPL: 15 %
IRON SERPL-MCNC: 31 UG/DL (ref 50–175)
POTASSIUM SERPL-SCNC: 3.7 MMOL/L (ref 3.5–5.5)
SERVICE CMNT-IMP: NORMAL
SERVICE CMNT-IMP: NORMAL
TIBC SERPL-MCNC: 209 UG/DL (ref 250–450)

## 2019-02-20 PROCEDURE — 82962 GLUCOSE BLOOD TEST: CPT

## 2019-02-20 PROCEDURE — 36415 COLL VENOUS BLD VENIPUNCTURE: CPT

## 2019-02-20 PROCEDURE — 74011250637 HC RX REV CODE- 250/637: Performed by: INTERNAL MEDICINE

## 2019-02-20 PROCEDURE — 74011250637 HC RX REV CODE- 250/637: Performed by: EMERGENCY MEDICINE

## 2019-02-20 PROCEDURE — 83540 ASSAY OF IRON: CPT

## 2019-02-20 PROCEDURE — 82728 ASSAY OF FERRITIN: CPT

## 2019-02-20 PROCEDURE — 74011250637 HC RX REV CODE- 250/637: Performed by: UROLOGY

## 2019-02-20 PROCEDURE — 74011250636 HC RX REV CODE- 250/636: Performed by: INTERNAL MEDICINE

## 2019-02-20 PROCEDURE — 84132 ASSAY OF SERUM POTASSIUM: CPT

## 2019-02-20 RX ORDER — DUTASTERIDE 0.5 MG/1
0.5 CAPSULE, LIQUID FILLED ORAL DAILY
Qty: 30 CAP | Refills: 0 | Status: SHIPPED | OUTPATIENT
Start: 2019-02-20 | End: 2019-04-08 | Stop reason: SDUPTHER

## 2019-02-20 RX ADMIN — HYDRALAZINE HYDROCHLORIDE 10 MG: 20 INJECTION INTRAMUSCULAR; INTRAVENOUS at 09:41

## 2019-02-20 RX ADMIN — DUTASTERIDE 0.5 MG: 0.5 CAPSULE, LIQUID FILLED ORAL at 09:40

## 2019-02-20 RX ADMIN — ACETAMINOPHEN 650 MG: 325 TABLET ORAL at 00:26

## 2019-02-20 RX ADMIN — TAMSULOSIN HYDROCHLORIDE 0.4 MG: 0.4 CAPSULE ORAL at 09:12

## 2019-02-20 RX ADMIN — AMLODIPINE BESYLATE 5 MG: 5 TABLET ORAL at 09:12

## 2019-02-20 NOTE — DISCHARGE SUMMARY
[facility]  Discharge Summary       Patient: Forrest Sanford Age: 80 y.o. : 1935 MR#: [id number] SSN: [id number]  PCP on record: Raymundo Herrera MD  Admit date: 2019  Discharge date: 2019    Disposition:    []Home   [x]Home with Home Health   []SNF/NH   []Rehab   []Home with family   []Alternate Facility:____________________    Admission Diagnoses:  Hematuria [R31.9]  Episodic lightheadedness [R42]    Discharge Diagnoses:                             1. Gross Hematuria   2. Anemia, normocytic likely d/t acute blood loss  3.  UTI  4. Hypertension   4. Type 2 DM - A1c 6.8   5. Hypokalemia    6. Gout   7. BPH   8. Mass at base of bladder per CT urogram    Discharge Medications:     Current Discharge Medication List      START taking these medications    Details   dutasteride (AVODART) 0.5 mg capsule Take 1 Cap by mouth daily. Qty: 30 Cap, Refills: 0         CONTINUE these medications which have NOT CHANGED    Details   metoprolol tartrate (LOPRESSOR) 25 mg tablet Take 25 mg by mouth two (2) times a day. metFORMIN (GLUCOPHAGE) 500 mg tablet Take  by mouth two (2) times daily (with meals). allopurinol (ZYLOPRIM) 100 mg tablet Take 100 mg by mouth daily. folic acid (FOLVITE) 1 mg tablet Take  by mouth daily. docusate sodium (COLACE) 100 mg capsule Take 100 mg by mouth two (2) times a day. amLODIPine (NORVASC) 5 mg tablet Take 5 mg by mouth daily. simvastatin (ZOCOR) 20 mg tablet Take  by mouth nightly. fish oil-dha-epa (FISH OIL) 1,200-144-216 mg Cap Take  by mouth.      gabapentin (NEURONTIN) 100 mg capsule Take  by mouth three (3) times daily. valsartan-hydrochlorothiazide (DIOVAN HCT) 160-12.5 mg per tablet Take 1 Tab by mouth daily. tamsulosin (FLOMAX) 0.4 mg capsule Take 0.4 mg by mouth daily. Magnesium Oxide 500 mg Cap Take  by mouth.       risperidone (RISPERDAL) 3 mg tablet Take  by mouth.      hydrocodone-acetaminophen (VICODIN ES) 7.5-750 mg per tablet Take  by mouth every six (6) hours as needed. STOP taking these medications       hydroCHLOROthiazide (HYDRODIURIL) 25 mg tablet Comments:   Reason for Stopping:         finasteride (PROSCAR) 5 mg tablet Comments:   Reason for Stopping:         colchicine 0.6 mg tablet Comments:   Reason for Stopping:         ciprofloxacin (CIPRO) 500 mg tablet Comments:   Reason for Stopping:         naproxen (NAPROSYN) 500 mg tablet Comments:   Reason for Stopping:         oxycodone-acetaminophen (TYLOX) 5-500 mg per capsule Comments:   Reason for Stopping:               Consults:  Urology  -   Procedures: none  -     Significant Diagnostic Studies:   CT Urogram:  IMPRESSION:     CT UROGRAM:     Mass at base of bladder likely at least partly due to extension of a  heterogeneously enlarged prostate gland, however there is also more exuberant  posterior bladder wall thickening. Findings are worrisome for bladder mass. Bladder however is not optimally evaluated due to suboptimal distention in the  setting of catheterization. Further investigation recommended.  -Heterogeneous bladder lumen contents including contrast, may represent mixing  of nonopacified urine. Recommend correlation with urinalysis.     Punctate right kidney nephrolith. No hydronephrosis. Bifid left ureter noted.     NONUROGRAPHIC:     Moderate to large hiatal hernia. Please see urogram findings above. Hospital Course by Problem   HPI per admitting provider:  \"Mr. Jett Kern is a 80 y.o.   male with a PMH of HTN, hyperlipidemia, Gout and BPH who presents with c/c of hematuria. Patient has sinai hematuria and went to see his PCP today who send him to ED for evaluation. He continue to have hematuria here in ED. He denies fever or chills. No abdominal or pelvic pain. No nausea or vomiting. Here in ED his H/H was 11.5/35.  UA is +ve for UTI, started on antibiotics, urology also is consulted and admitted for further evaluation and management. \"    1. Gross Hematuria - admitted with gross hematuria. urology consulted and followed while inpt. On 2/15/19 nursing attempted to place manning catheter, encountered difficulty with insertion and at which time patient he had gross hematuria.  Urology was needed to insert catheter and connected to continuous bladder irrigation (CBI). Courtney Ramos gradually improved and patient was supported with blood transfusions x 2 during admission. Maximo Quezada was clamped on 54/80 without complication and voiding trial completed on 02/19 per urology. Patient did have a CT urogram indicating bladder mass which was reviewed with daughter Abiel Burrell prior to discharge with plan for outpatient follow up with urology for cystoscopy. Pt was voiding well after manning was removed. His urine was clear yellow with no blood 1 day prior to discharge and at time of discharge. His PVR bladder scans were 0. Plan is to follow up with urology as OP as above. 2. Anemia, normocytic likely d/t blood loss- hemoglobin trended as follows 11.5>9.3>10.8>8.0 (patient with sig hematuria)>6.8 (1 unit PRBCs tx) > 7.6 > 6.8 (1 unit PRBCs tx) >7.7>8.2 and 7.7 prior to discharge. Script given to check CBC in 3 days as OP. No more gross hematuria prior to discharge. 3.  UTI - concern per urinalysis, however urine culture with no growth.  Treated with zosyn and vancomycin while inpatient and no need for further antibiotics at time of discharge  4. Hypertension - Oral antihypertensives discontinue initially in setting of gross hematuria and low blood pressure.  With stabilization of #1 oral antihypertensives resumed and blood pressure stable at time of discharge. 4. Type 2 DM - A1c 6.8 at time of admission and covered with SSI only (overall well controlled).  Resume metformin at time of discharge.     5. Hypokalemia - requiring replacement and resolved at time of discharge with normal magnesium levels while inpatient.      6. Gout - no current evidence of flare   7. BPH - cont avodart / flomax; follow renal function   8. Mass at base of bladder: CT urogram, plan for cystoscopy as OP with Urology    Today's examination of the patient revealed:     Subjective:   Pt reports doing \"pretty good\". No chest pain, SOB, abd pain, fevers, or chills. Urine is yellow and clear. Objective:   VS:   Visit Vitals  /73   Pulse (!) 53   Temp 98.7 °F (37.1 °C)   Resp 18   Ht 5' 9\" (1.753 m)   Wt 89.5 kg (197 lb 4.8 oz)   SpO2 96%   BMI 29.14 kg/m²      Tmax/24hrs: Temp (24hrs), Av.6 °F (37 °C), Min:98 °F (36.7 °C), Max:99.6 °F (37.6 °C)     Input/Output:     Intake/Output Summary (Last 24 hours) at 2019 0951  Last data filed at 2019 0946  Gross per 24 hour   Intake 1960 ml   Output 4030 ml   Net -2070 ml       General:  Alert, NAD  Cardiovascular:  RRR  Pulmonary:  LSC throughout; respiratory effort WNL  GI:  +BS in all four quadrants, soft, non-tender  Extremities:  No edema; Neuro: AAOX3    Labs:    Recent Results (from the past 24 hour(s))   GLUCOSE, POC    Collection Time: 19 11:27 AM   Result Value Ref Range    Glucose (POC) 149 (H) 70 - 110 mg/dL   GLUCOSE, POC    Collection Time: 19  3:49 PM   Result Value Ref Range    Glucose (POC) 135 (H) 70 - 110 mg/dL   GLUCOSE, POC    Collection Time: 19  9:34 PM   Result Value Ref Range    Glucose (POC) 124 (H) 70 - 110 mg/dL   IRON PROFILE    Collection Time: 19  3:34 AM   Result Value Ref Range    Iron 31 (L) 50 - 175 ug/dL    TIBC 209 (L) 250 - 450 ug/dL    Iron % saturation 15 %   FERRITIN    Collection Time: 19  3:34 AM   Result Value Ref Range    Ferritin 55 8 - 388 NG/ML   POTASSIUM    Collection Time: 19  3:34 AM   Result Value Ref Range    Potassium 3.7 3.5 - 5.5 mmol/L     Additional Data Reviewed:     Condition: Stable  Follow-up Appointments:   1. Your PCP: Kalyn Garces MD, within 7-10days  2.  Your Urologist; Kait Caro MD within 1 week    >30 minutes spent coordinating this discharge (review instructions/follow-up, prescriptions, preparing report for sign off)    Signed:  Sheryl Davis PA-C  2/20/2019  9:51 AM

## 2019-02-20 NOTE — PROGRESS NOTES
Per [employer] (CM) sent Home Health referral to [facility] Esp[facility]za in [facility]. Informed [employer] referral has been sent.

## 2019-02-20 NOTE — ROUTINE PROCESS
Bedside and Verbal shift change report given to CHARMAINE Pugh (oncoming nurse) by CHARMAINE Patrick (offgoing nurse). Report included the following information SBAR, Kardex, ED Summary, Procedure Summary, Intake/Output and Recent Results.

## 2019-02-20 NOTE — PROGRESS NOTES
Important Message from Medicare reviewed and explained with the patient and/or representative at bedside, patient acknowledged understanding but refused to sign. A copy provided to patient/representative. CMS unable to obtain signature, patient refused.

## 2019-02-20 NOTE — DISCHARGE INSTRUCTIONS
Patient Education   MyChart Activation    Thank you for requesting access to [address] Please follow the instructions below to securely access and download your online medical record. [facility] allows you to send messages to your doctor, view your test results, renew your prescriptions, schedule appointments, and more. How Do I Sign Up? 1. In your internet browser, go to [email or web address]  2. Click on the First Time User? Click Here link in the Sign In box. You will be redirect to the New Member Sign Up page. 3. Enter your [facility] Access Code exactly as it appears below. You will not need to use this code after youve completed the sign-up process. If you do not sign up before the expiration date, you must request a new code. [facility] Access Code: [email or web address]  Expires: 3/31/2019 12:01 PM (This is the date your [facility] access code will )    4. Enter the last four digits of your Social Security Number (xxxx) and Date of Birth (mm/dd/yyyy) as indicated and click Submit. You will be taken to the next sign-up page. 5. Create a Utrip ID. This will be your [facility] login ID and cannot be changed, so think of one that is secure and easy to remember. 6. Create a Ut[facility] password. You can change your password at any time. 7. Enter your Password Reset Question and Answer. This can be used at a later time if you forget your password. 8. Enter your e-mail address. You will receive e-mail notification when new information is available in [address] 9. Click Sign Up. You can now view and download portions of your medical record. 10. Click the Download Summary menu link to download a portable copy of your medical information. Additional Information    If you have questions, please visit the Frequently Asked Questions section of the [facility] website at [email or web address] Remember, [facility] is NOT to be used for urgent needs. For medical emergencies, dial 911.     Patient armband removed and shredded  DISCHARGE SUMMARY from Nurse    PATIENT INSTRUCTIONS:    After general anesthesia or intravenous sedation, for 24 hours or while taking prescription Narcotics:  · Limit your activities  · Do not drive and operate hazardous machinery  · Do not make important personal or business decisions  · Do  not drink alcoholic beverages  · If you have not urinated within 8 hours after discharge, please contact your surgeon on call. Report the following to your surgeon:  · Excessive pain, swelling, redness or odor of or around the surgical area  · Temperature over 100.5  · Nausea and vomiting lasting longer than 4 hours or if unable to take medications  · Any signs of decreased circulation or nerve impairment to extremity: change in color, persistent  numbness, tingling, coldness or increase pain  · Any questions    What to do at Home:  Recommended activity: Activity as tolerated. If you experience any of the following symptoms dizziness or fainting. Bloody urine, unable to void, please follow up with primary care doctor or urologist..    *  Please give a list of your current medications to your Primary Care Provider. *  Please update this list whenever your medications are discontinued, doses are      changed, or new medications (including over-the-counter products) are added. *  Please carry medication information at all times in case of emergency situations. These are general instructions for a healthy lifestyle:    No smoking/ No tobacco products/ Avoid exposure to second hand smoke  Surgeon General's Warning:  Quitting smoking now greatly reduces serious risk to your health.     Obesity, smoking, and sedentary lifestyle greatly increases your risk for illness    A healthy diet, regular physical exercise & weight monitoring are important for maintaining a healthy lifestyle    You may be retaining fluid if you have a history of heart failure or if you experience any of the following symptoms:  Weight gain of 3 pounds or more overnight or 5 pounds in a week, increased swelling in our hands or feet or shortness of breath while lying flat in bed. Please call your doctor as soon as you notice any of these symptoms; do not wait until your next office visit. Recognize signs and symptoms of STROKE:    F-face looks uneven    A-arms unable to move or move unevenly    S-speech slurred or non-existent    T-time-call 911 as soon as signs and symptoms begin-DO NOT go       Back to bed or wait to see if you get better-TIME IS BRAIN. Warning Signs of HEART ATTACK     Call 911 if you have these symptoms:   Chest discomfort. Most heart attacks involve discomfort in the center of the chest that lasts more than a few minutes, or that goes away and comes back. It can feel like uncomfortable pressure, squeezing, fullness, or pain.  Discomfort in other areas of the upper body. Symptoms can include pain or discomfort in one or both arms, the back, neck, jaw, or stomach.  Shortness of breath with or without chest discomfort.  Other signs may include breaking out in a cold sweat, nausea, or lightheadedness. Don't wait more than five minutes to call 911 - MINUTES MATTER! Fast action can save your life. Calling 911 is almost always the fastest way to get lifesaving treatment. Emergency Medical Services staff can begin treatment when they arrive -- up to an hour sooner than if someone gets to the hospital by car. The discharge information has been reviewed with the patient and caregiver. The patient and caregiver verbalized understanding. Discharge medications reviewed with the patient and caregiver and appropriate educational materials and side effects teaching were provided.   ___________________________________________________________________________________________________________________________________     Blood in the Urine: Care Instructions  Your Care Instructions    Blood in the urine, or hematuria, may make the urine look red, brown, or pink. There may be blood every time you urinate or just from time to time. You cannot always see blood in the urine, but it will show up in a urine test.  Blood in the urine may be serious. It should always be checked by a doctor. Your doctor may recommend more tests, including an X-ray, a CT scan, or a cystoscopy (which lets a doctor look inside the urethra and bladder). Blood in the urine can be a sign of another problem. Common causes are bladder infections and kidney stones. An injury to your groin or your genital area can also cause bleeding in the urinary tract. Very hard exercise--such as running a marathon--can cause blood in the urine. Blood in the urine can also be a sign of kidney disease or cancer in the bladder or kidney. Many cases of blood in the urine are caused by a harmless condition that runs in families. This is called benign familial hematuria. It does not need any treatment. Sometimes your urine may look red or brown even though it does not contain blood. For example, not getting enough fluids (dehydration), taking certain medicines, or having a liver problem can change the color of your urine. Eating foods such as beets, rhubarb, or blackberries or foods with red food coloring can make your urine look red or pink. Follow-up care is a key part of your treatment and safety. Be sure to make and go to all appointments, and call your doctor if you are having problems. It's also a good idea to know your test results and keep a list of the medicines you take. When should you call for help? Call your doctor now or seek immediate medical care if:    · You have symptoms of a urinary infection. For example:  ? You have pus in your urine. ? You have pain in your back just below your rib cage. This is called flank pain. ? You have a fever, chills, or body aches. ? It hurts to urinate. ?  You have groin or belly pain.     · You have more blood in your urine.    Watch closely for changes in your health, and be sure to contact your doctor if:    · You have new urination problems.     · You do not get better as expected. Where can you learn more? Go to http://luis carlos-katia.info/. Enter T032 in the search box to learn more about \"Blood in the Urine: Care Instructions. \"  Current as of: March 20, 2018  Content Version: 11.9  © 4402-0770 [employer]. Care instructions adapted under license by [employer] (which disclaims liability or warranty for this information). If you have questions about a medical condition or this instruction, always ask your healthcare professional. [place] [address] any warranty or liability for your use of this information. Patient Education        Lightheadedness or Faintness: Care Instructions  Your Care Instructions  Lightheadedness is a feeling that you are about to faint or \"pass out. \" You do not feel as if you or your surroundings are moving. It is different from vertigo, which is the feeling that you or things around you are spinning or tilting. Lightheadedness usually goes away or gets better when you lie down. If lightheadedness gets worse, it can lead to a fainting spell. It is common to feel lightheaded from time to time. Lightheadedness usually is not caused by a serious problem. It often is caused by a short-lasting drop in blood pressure and blood flow to your head that occurs when you get up too quickly from a seated or lying position. Follow-up care is a key part of your treatment and safety. Be sure to make and go to all appointments, and call your doctor if you are having problems. It's also a good idea to know your test results and keep a list of the medicines you take. How can you care for yourself at home? · Lie down for 1 or 2 minutes when you feel lightheaded.  After lying down, sit up slowly and remain sitting for 1 to 2 minutes before slowly standing up. · Avoid movements, positions, or activities that have made you lightheaded in the past.  · Get plenty of rest, especially if you have a cold or flu, which can cause lightheadedness. · Make sure you drink plenty of fluids, especially if you have a fever or have been sweating. · Do not drive or put yourself and others in danger while you feel lightheaded. When should you call for help? Call 911 anytime you think you may need emergency care. For example, call if:    · You have symptoms of a stroke. These may include:  ? Sudden numbness, tingling, weakness, or loss of movement in your face, arm, or leg, especially on only one side of your body. ? Sudden vision changes. ? Sudden trouble speaking. ? Sudden confusion or trouble understanding simple statements. ? Sudden problems with walking or balance. ? A sudden, severe headache that is different from past headaches.     · You have symptoms of a heart attack. These may include:  ? Chest pain or pressure, or a strange feeling in the chest.  ? Sweating. ? Shortness of breath. ? Nausea or vomiting. ? Pain, pressure, or a strange feeling in the back, neck, jaw, or upper belly or in one or both shoulders or arms. ? Lightheadedness or sudden weakness. ? A fast or irregular heartbeat. After you call 911, the  may tell you to chew 1 adult-strength or 2 to 4 low-dose aspirin. Wait for an ambulance. Do not try to drive yourself.    Watch closely for changes in your health, and be sure to contact your doctor if:    · Your lightheadedness gets worse or does not get better with home care. Where can you learn more? Go to http://luis carlos-katia.info/. Enter Z239 in the search box to learn more about \"Lightheadedness or Faintness: Care Instructions. \"  Current as of: September 23, 2018  Content Version: 11.9  © 3268-0689 [employer].  Care instructions adapted under license by [employer] (which disclaims liability or warranty for this information). If you have questions about a medical condition or this instruction, always ask your healthcare professional. [place] [address] any warranty or liability for your use of this information.

## 2019-02-20 NOTE — PROGRESS NOTES
Discharge order noted for today. Pt has been accepted to [address] [place] [address] agency. Met with patient and daughter Reed Crabtree and are agreeable to the transition plan today. Transport has been arranged through pt's family member. Patient's discharge summary and home health  orders have been forwarded to [address] via  [place]. UAI completed and family chose agency to start at pt's discharge Updated bedside  Iliana Perales,   to the transition plan.   Discharge information has been documented on the AVS.       [facility]  [phone]

## 2019-02-20 NOTE — PROGRESS NOTES
[address], [address]                                                      Phone: [phone]  Urology Daily Progress Note    Patient Active Problem List   Diagnosis Code    Right arm pain M79.601    Hypertension I10    Hypercholesterolemia E78.00    Retention of urine, unspecified R33.9    BPH with obstruction/lower urinary tract symptoms N40.1, N13.8    H/O seasonal allergies Z88.9    Urinary retention R33.9    Arthropathy, unspecified, other specified sites M12.9    Tattoo L81.8    Pain with swallowing R13.10    SOB (shortness of breath) R06.02    Cough R05    Bone pain M89.8X9    Seizures (HCC) R56.9    Hematuria R31.9    Episodic lightheadedness R42         Assessment & Plan   Assessment  Gross hematuria - resolved  ? UTI by UA. Ucx 10K mixed, Repeat Ucx neg, Bcx. NGTD. On Zosyn  Possible bladder tumor       Plan:  Urine remains clear. Passed VT yesterday with PVR 0 x 3  Continue Avodart and Flomax as outpt. Follow up arranged: YES per Dr. Kendrick Matamoros note arrangements made for cystoscopy to be performed as an outpatient.   Will sign off at this time. Please contact with any questions or concerns    Foreign C[employer]. Breanna Hugo [facility]  [facility]  Pager # [phone]    Available M-F 7:30- 5pm .  After 5pm and weekends please call answering service at [phone]         Subjective   Voiding without difficulty         Objective       PHYSICAL EXAMINATION:   Visit Vitals  /73   Pulse (!) 53   Temp 98.7 °F (37.1 °C)   Resp 18   Ht 5' 9\" (1.753 m)   Wt 197 lb 4.8 oz (89.5 kg)   SpO2 96%   BMI 29.14 kg/m²     Constitutional: Well developed, well nourished male. No acute distress. HEENT: Normocephalic, Atraumatic   CV:  RRR   Pulm: No respiratory distress or difficulties breathing   GI:  No abdominal masses or tenderness.     [de-identified]   CVA tenderness: None SCROTUM:  Normal         PENIS: Normal    Skin: No evidence of jaundice. Normal color  Neuro/Psych:  Alert and oriented. Affect appropriate. Lymphatic:   No enlarged supraclavicular lymphnodes  MSK: FROM         REVIEW OF LABS AND IMAGING:      Labs:     Labs: Results:   Chemistry    Recent Labs     02/20/19  0334 02/19/19  0236 02/18/19  0506   GLU  --  126* 148*   NA  --  142 140   K 3.7 3.2* 3.2*   CL  --  111* 109*   CO2  --  23 24   BUN  --  7 7   CREA  --  1.04 1.16   CA  --  8.1* 8.2*   AGAP  --  8 7   BUCR  --  7* 6*      CBC w/Diff Recent Labs     02/19/19  0236 02/18/19  0506 02/17/19  1405   WBC 6.3 9.5  --    RBC 2.81* 2.94*  --    HGB 7.7* 8.2* 7.7*   HCT 24.1* 25.1* 23.4*    141  --    GRANS 64 74*  --    LYMPH 22 15*  --    EOS 6* 3  --       Cultures No results for input(s): CULT in the last 72 hours. All Micro Results     Procedure Component Value Units Date/Time    CULTURE, BLOOD [[id number]] Collected:  02/14/19 1615    Order Status:  Completed Specimen:  Whole Blood Updated:  02/20/19 0755     Special Requests: NO SPECIAL REQUESTS        Culture result: NO GROWTH 6 DAYS       CULTURE, BLOOD [[id number]] Collected:  02/14/19 1630    Order Status:  Completed Specimen:  Whole Blood Updated:  02/20/19 0755     Special Requests: NO SPECIAL REQUESTS        Culture result: NO GROWTH 6 DAYS       CULTURE, URINE [[id number]] Collected:  02/14/19 2248    Order Status:  Completed Specimen:  Urine from Clean catch Updated:  02/16/19 0929     Special Requests: NO SPECIAL REQUESTS        Culture result: NO GROWTH 2 DAYS       CULTURE, URINE [[id number]] Collected:  02/14/19 1236    Order Status:  Completed Specimen:  Clean catch Updated:  02/15/19 1230     Special Requests: NO SPECIAL REQUESTS        Culture result:       <10,000  COLONIES/mL  MIXED GRAM POSITIVE MADHU, PROBABLE SKIN/GENITAL CONTAMINATION.               Urinalysis Color   Date Value Ref Range Status   02/14/2019 RED   Final Appearance   Date Value Ref Range Status   02/14/2019 BLOODY   Final     Specific gravity   Date Value Ref Range Status   02/14/2019 1.010 1.003 - 1.030   Final     pH (UA)   Date Value Ref Range Status   02/14/2019 8.5 (H) 5.0 - 8.0   Final     Protein   Date Value Ref Range Status   02/14/2019 >300 (A) NEG mg/dL Final     Ketone   Date Value Ref Range Status   02/14/2019 40 (A) NEG mg/dL Final     Bilirubin   Date Value Ref Range Status   02/14/2019 LARGE (A) NEG   Final     Blood   Date Value Ref Range Status   02/14/2019 LARGE (A) NEG   Final     Urobilinogen   Date Value Ref Range Status   02/14/2019 4.0 (H) 0.2 - 1.0 EU/dL Final     Nitrites   Date Value Ref Range Status   02/14/2019 NEGATIVE  NEG   Final     Leukocyte Esterase   Date Value Ref Range Status   02/14/2019 LARGE (A) NEG   Final     Potassium   Date Value Ref Range Status   02/20/2019 3.7 3.5 - 5.5 mmol/L Final     Creatinine   Date Value Ref Range Status   02/19/2019 1.04 0.6 - 1.3 MG/DL Final     BUN   Date Value Ref Range Status   02/19/2019 7 7.0 - 18 MG/DL Final     Prostate Specific Ag   Date Value Ref Range Status   03/26/2012 10.9  Final      PSA No results for input(s): PSA in the last 72 hours.    Coagulation Lab Results   Component Value Date/Time    Prothrombin time 13.2 02/14/2019 11:45 AM    Prothrombin time 13.6 12/30/2010 12:32 PM    INR 1.0 02/14/2019 11:45 AM    INR 1.1 12/30/2010 12:32 PM    aPTT 22.6 (L) 12/30/2010 12:32 PM    aPTT 25.4 12/30/2010 09:14 AM

## 2019-02-20 NOTE — ROUTINE PROCESS
Bedside and Verbal shift change report given to Hussain Gillette RN (oncoming nurse) by Carolee Capellan RN (offgoing nurse). Report included the following information SBAR, Kardex, MAR and Recent Results.

## 2019-02-20 NOTE — PROGRESS NOTES
Problem: Falls - Risk of  Goal: *Absence of Falls  Document Rylie Fall Risk and appropriate interventions in the flowsheet.   Outcome: Progressing Towards Goal  Fall Risk Interventions:            Medication Interventions: Patient to call before getting OOB, Teach patient to arise slowly    Elimination Interventions: Call light in reach

## 2019-07-11 ENCOUNTER — HOSPITAL ENCOUNTER (OUTPATIENT)
Dept: LAB | Age: 84
Discharge: HOME | End: 2019-07-11
Payer: MEDICARE

## 2019-07-11 DIAGNOSIS — M17.0 PRIMARY OSTEOARTHRITIS OF BOTH KNEES: ICD-10-CM

## 2019-07-11 DIAGNOSIS — E11.29 TYPE II OR UNSPECIFIED TYPE DIABETES MELLITUS WITH RENAL MANIFESTATIONS, UNCONTROLLED(250.42) (HCC): ICD-10-CM

## 2019-07-11 DIAGNOSIS — M10.9 GOUT, UNSPECIFIED: ICD-10-CM

## 2019-07-11 DIAGNOSIS — I10 ESSENTIAL HYPERTENSION, MALIGNANT: ICD-10-CM

## 2019-07-11 DIAGNOSIS — E11.65 TYPE II OR UNSPECIFIED TYPE DIABETES MELLITUS WITH RENAL MANIFESTATIONS, UNCONTROLLED(250.42) (HCC): ICD-10-CM

## 2019-07-11 DIAGNOSIS — R97.20 ELEVATED PROSTATE SPECIFIC ANTIGEN (PSA): ICD-10-CM

## 2019-07-11 LAB
ALBUMIN SERPL-MCNC: 4 G/DL (ref 3.4–5)
ALBUMIN/GLOB SERPL: 1.1 {RATIO} (ref 0.8–1.7)
ALP SERPL-CCNC: 76 U/L (ref 45–117)
ALT SERPL-CCNC: 21 U/L (ref 16–61)
ANION GAP SERPL CALC-SCNC: 6 MMOL/L (ref 3–18)
AST SERPL-CCNC: 15 U/L (ref 15–37)
BASOPHILS # BLD: 0 K/UL (ref 0–0.1)
BASOPHILS NFR BLD: 0 % (ref 0–2)
BILIRUB SERPL-MCNC: 0.3 MG/DL (ref 0.2–1)
BUN SERPL-MCNC: 13 MG/DL (ref 7–18)
BUN/CREAT SERPL: 12 (ref 12–20)
CALCIUM SERPL-MCNC: 8.8 MG/DL (ref 8.5–10.1)
CHLORIDE SERPL-SCNC: 107 MMOL/L (ref 100–108)
CHOLEST SERPL-MCNC: 118 MG/DL
CO2 SERPL-SCNC: 27 MMOL/L (ref 21–32)
CREAT SERPL-MCNC: 1.12 MG/DL (ref 0.6–1.3)
CREAT UR-MCNC: 166 MG/DL (ref 30–125)
DIFFERENTIAL METHOD BLD: ABNORMAL
EOSINOPHIL # BLD: 0.1 K/UL (ref 0–0.4)
EOSINOPHIL NFR BLD: 2 % (ref 0–5)
ERYTHROCYTE [DISTWIDTH] IN BLOOD BY AUTOMATED COUNT: 16.1 % (ref 11.6–14.5)
GLOBULIN SER CALC-MCNC: 3.7 G/DL (ref 2–4)
GLUCOSE SERPL-MCNC: 68 MG/DL (ref 74–99)
HBA1C MFR BLD: 6.7 % (ref 4.2–5.6)
HCT VFR BLD AUTO: 39.7 % (ref 36–48)
HDLC SERPL-MCNC: 41 MG/DL (ref 40–60)
HDLC SERPL: 2.9 {RATIO} (ref 0–5)
HGB BLD-MCNC: 12.9 G/DL (ref 13–16)
LDLC SERPL CALC-MCNC: 54.8 MG/DL (ref 0–100)
LIPID PROFILE,FLP: NORMAL
LYMPHOCYTES # BLD: 2.1 K/UL (ref 0.9–3.6)
LYMPHOCYTES NFR BLD: 30 % (ref 21–52)
MCH RBC QN AUTO: 26.4 PG (ref 24–34)
MCHC RBC AUTO-ENTMCNC: 32.5 G/DL (ref 31–37)
MCV RBC AUTO: 81.4 FL (ref 74–97)
MICROALBUMIN UR-MCNC: 2.14 MG/DL (ref 0–3)
MICROALBUMIN/CREAT UR-RTO: 13 MG/G (ref 0–30)
MONOCYTES # BLD: 0.5 K/UL (ref 0.05–1.2)
MONOCYTES NFR BLD: 7 % (ref 3–10)
NEUTS SEG # BLD: 4.2 K/UL (ref 1.8–8)
NEUTS SEG NFR BLD: 61 % (ref 40–73)
PLATELET # BLD AUTO: 136 K/UL (ref 135–420)
PMV BLD AUTO: 12.1 FL (ref 9.2–11.8)
POTASSIUM SERPL-SCNC: 3.9 MMOL/L (ref 3.5–5.5)
PROT SERPL-MCNC: 7.7 G/DL (ref 6.4–8.2)
RBC # BLD AUTO: 4.88 M/UL (ref 4.7–5.5)
SODIUM SERPL-SCNC: 140 MMOL/L (ref 136–145)
TRIGL SERPL-MCNC: 111 MG/DL (ref ?–150)
TSH SERPL DL<=0.05 MIU/L-ACNC: 1.59 UIU/ML (ref 0.36–3.74)
VLDLC SERPL CALC-MCNC: 22.2 MG/DL
WBC # BLD AUTO: 7 K/UL (ref 4.6–13.2)

## 2019-07-11 PROCEDURE — 82043 UR ALBUMIN QUANTITATIVE: CPT

## 2019-07-11 PROCEDURE — 83036 HEMOGLOBIN GLYCOSYLATED A1C: CPT

## 2019-07-11 PROCEDURE — 36415 COLL VENOUS BLD VENIPUNCTURE: CPT

## 2019-07-11 PROCEDURE — 84443 ASSAY THYROID STIM HORMONE: CPT

## 2019-07-11 PROCEDURE — 80053 COMPREHEN METABOLIC PANEL: CPT

## 2019-07-11 PROCEDURE — 80061 LIPID PANEL: CPT

## 2019-07-11 PROCEDURE — 85025 COMPLETE CBC W/AUTO DIFF WBC: CPT

## 2019-07-11 PROCEDURE — 84436 ASSAY OF TOTAL THYROXINE: CPT

## 2019-07-12 LAB — T4 SERPL-MCNC: 7.6 UG/DL (ref 4.5–12.1)

## 2020-01-28 ENCOUNTER — HOSPITAL ENCOUNTER (OUTPATIENT)
Dept: LAB | Age: 85
Discharge: HOME | End: 2020-01-28
Payer: MEDICARE

## 2020-01-28 LAB
ALBUMIN SERPL-MCNC: 3.7 G/DL (ref 3.4–5)
ALBUMIN/GLOB SERPL: 1 {RATIO} (ref 0.8–1.7)
ALP SERPL-CCNC: 82 U/L (ref 45–117)
ALT SERPL-CCNC: 15 U/L (ref 16–61)
ANION GAP SERPL CALC-SCNC: 7 MMOL/L (ref 3–18)
AST SERPL-CCNC: 12 U/L (ref 10–38)
BASOPHILS # BLD: 0 K/UL (ref 0–0.1)
BASOPHILS NFR BLD: 0 % (ref 0–2)
BILIRUB SERPL-MCNC: 0.3 MG/DL (ref 0.2–1)
BUN SERPL-MCNC: 11 MG/DL (ref 7–18)
BUN/CREAT SERPL: 11 (ref 12–20)
CALCIUM SERPL-MCNC: 8.7 MG/DL (ref 8.5–10.1)
CHLORIDE SERPL-SCNC: 111 MMOL/L (ref 100–111)
CHOLEST SERPL-MCNC: 138 MG/DL
CO2 SERPL-SCNC: 22 MMOL/L (ref 21–32)
CREAT SERPL-MCNC: 1.04 MG/DL (ref 0.6–1.3)
DIFFERENTIAL METHOD BLD: ABNORMAL
EOSINOPHIL # BLD: 0.2 K/UL (ref 0–0.4)
EOSINOPHIL NFR BLD: 3 % (ref 0–5)
ERYTHROCYTE [DISTWIDTH] IN BLOOD BY AUTOMATED COUNT: 14.1 % (ref 11.6–14.5)
GLOBULIN SER CALC-MCNC: 3.8 G/DL (ref 2–4)
GLUCOSE SERPL-MCNC: 98 MG/DL (ref 74–99)
HBA1C MFR BLD: 5.9 % (ref 4.2–5.6)
HCT VFR BLD AUTO: 33.3 % (ref 36–48)
HDLC SERPL-MCNC: 39 MG/DL (ref 40–60)
HDLC SERPL: 3.5 {RATIO} (ref 0–5)
HGB BLD-MCNC: 10.7 G/DL (ref 13–16)
LDLC SERPL CALC-MCNC: 77.8 MG/DL (ref 0–100)
LIPID PROFILE,FLP: ABNORMAL
LYMPHOCYTES # BLD: 1.5 K/UL (ref 0.9–3.6)
LYMPHOCYTES NFR BLD: 23 % (ref 21–52)
MCH RBC QN AUTO: 26.8 PG (ref 24–34)
MCHC RBC AUTO-ENTMCNC: 32.1 G/DL (ref 31–37)
MCV RBC AUTO: 83.3 FL (ref 74–97)
MONOCYTES # BLD: 0.4 K/UL (ref 0.05–1.2)
MONOCYTES NFR BLD: 6 % (ref 3–10)
NEUTS SEG # BLD: 4.5 K/UL (ref 1.8–8)
NEUTS SEG NFR BLD: 68 % (ref 40–73)
PLATELET # BLD AUTO: 174 K/UL (ref 135–420)
PMV BLD AUTO: 12.2 FL (ref 9.2–11.8)
POTASSIUM SERPL-SCNC: 3.6 MMOL/L (ref 3.5–5.5)
PROT SERPL-MCNC: 7.5 G/DL (ref 6.4–8.2)
RBC # BLD AUTO: 4 M/UL (ref 4.7–5.5)
SODIUM SERPL-SCNC: 140 MMOL/L (ref 136–145)
T4 SERPL-MCNC: 8.4 UG/DL (ref 4.5–12.1)
TRIGL SERPL-MCNC: 106 MG/DL (ref ?–150)
TSH SERPL DL<=0.05 MIU/L-ACNC: 0.13 UIU/ML (ref 0.36–3.74)
VLDLC SERPL CALC-MCNC: 21.2 MG/DL
WBC # BLD AUTO: 6.6 K/UL (ref 4.6–13.2)

## 2020-01-28 PROCEDURE — 84436 ASSAY OF TOTAL THYROXINE: CPT

## 2020-01-28 PROCEDURE — 84443 ASSAY THYROID STIM HORMONE: CPT

## 2020-01-28 PROCEDURE — 36415 COLL VENOUS BLD VENIPUNCTURE: CPT

## 2020-01-28 PROCEDURE — 80061 LIPID PANEL: CPT

## 2020-01-28 PROCEDURE — 85025 COMPLETE CBC W/AUTO DIFF WBC: CPT

## 2020-01-28 PROCEDURE — 80053 COMPREHEN METABOLIC PANEL: CPT

## 2020-01-28 PROCEDURE — 83036 HEMOGLOBIN GLYCOSYLATED A1C: CPT

## 2020-03-20 ENCOUNTER — HOSPITAL ENCOUNTER (OUTPATIENT)
Dept: LAB | Age: 85
Discharge: HOME | End: 2020-03-20
Payer: MEDICARE

## 2020-03-20 LAB
ALBUMIN SERPL-MCNC: 3.8 G/DL (ref 3.4–5)
ALBUMIN/GLOB SERPL: 1 {RATIO} (ref 0.8–1.7)
ALP SERPL-CCNC: 91 U/L (ref 45–117)
ALT SERPL-CCNC: 15 U/L (ref 16–61)
ANION GAP SERPL CALC-SCNC: 8 MMOL/L (ref 3–18)
AST SERPL-CCNC: 12 U/L (ref 10–38)
BASOPHILS # BLD: 0 K/UL (ref 0–0.1)
BASOPHILS NFR BLD: 0 % (ref 0–2)
BILIRUB SERPL-MCNC: 0.5 MG/DL (ref 0.2–1)
BUN SERPL-MCNC: 17 MG/DL (ref 7–18)
BUN/CREAT SERPL: 15 (ref 12–20)
CALCIUM SERPL-MCNC: 8.8 MG/DL (ref 8.5–10.1)
CHLORIDE SERPL-SCNC: 108 MMOL/L (ref 100–111)
CHOLEST SERPL-MCNC: 152 MG/DL
CO2 SERPL-SCNC: 23 MMOL/L (ref 21–32)
CREAT SERPL-MCNC: 1.13 MG/DL (ref 0.6–1.3)
DIFFERENTIAL METHOD BLD: ABNORMAL
EOSINOPHIL # BLD: 0.2 K/UL (ref 0–0.4)
EOSINOPHIL NFR BLD: 2 % (ref 0–5)
ERYTHROCYTE [DISTWIDTH] IN BLOOD BY AUTOMATED COUNT: 13.5 % (ref 11.6–14.5)
EST. AVERAGE GLUCOSE BLD GHB EST-MCNC: 120 MG/DL
GLOBULIN SER CALC-MCNC: 3.8 G/DL (ref 2–4)
GLUCOSE SERPL-MCNC: 120 MG/DL (ref 74–99)
HBA1C MFR BLD: 5.8 % (ref 4.2–5.6)
HCT VFR BLD AUTO: 36.2 % (ref 36–48)
HDLC SERPL-MCNC: 41 MG/DL (ref 40–60)
HDLC SERPL: 3.7 {RATIO} (ref 0–5)
HGB BLD-MCNC: 12 G/DL (ref 13–16)
LDLC SERPL CALC-MCNC: 93.4 MG/DL (ref 0–100)
LIPID PROFILE,FLP: NORMAL
LYMPHOCYTES # BLD: 1.9 K/UL (ref 0.9–3.6)
LYMPHOCYTES NFR BLD: 21 % (ref 21–52)
MCH RBC QN AUTO: 27.7 PG (ref 24–34)
MCHC RBC AUTO-ENTMCNC: 33.1 G/DL (ref 31–37)
MCV RBC AUTO: 83.6 FL (ref 74–97)
MONOCYTES # BLD: 0.4 K/UL (ref 0.05–1.2)
MONOCYTES NFR BLD: 4 % (ref 3–10)
NEUTS SEG # BLD: 6.4 K/UL (ref 1.8–8)
NEUTS SEG NFR BLD: 73 % (ref 40–73)
PLATELET # BLD AUTO: 179 K/UL (ref 135–420)
PMV BLD AUTO: 11.9 FL (ref 9.2–11.8)
POTASSIUM SERPL-SCNC: 3.7 MMOL/L (ref 3.5–5.5)
PROT SERPL-MCNC: 7.6 G/DL (ref 6.4–8.2)
RBC # BLD AUTO: 4.33 M/UL (ref 4.7–5.5)
SODIUM SERPL-SCNC: 139 MMOL/L (ref 136–145)
T4 SERPL-MCNC: 9 UG/DL (ref 4.5–12.1)
TRIGL SERPL-MCNC: 88 MG/DL (ref ?–150)
TSH SERPL DL<=0.05 MIU/L-ACNC: 1.92 UIU/ML (ref 0.36–3.74)
VLDLC SERPL CALC-MCNC: 17.6 MG/DL
WBC # BLD AUTO: 8.9 K/UL (ref 4.6–13.2)

## 2020-03-20 PROCEDURE — 85025 COMPLETE CBC W/AUTO DIFF WBC: CPT

## 2020-03-20 PROCEDURE — 84436 ASSAY OF TOTAL THYROXINE: CPT

## 2020-03-20 PROCEDURE — 83036 HEMOGLOBIN GLYCOSYLATED A1C: CPT

## 2020-03-20 PROCEDURE — 80053 COMPREHEN METABOLIC PANEL: CPT

## 2020-03-20 PROCEDURE — 36415 COLL VENOUS BLD VENIPUNCTURE: CPT

## 2020-03-20 PROCEDURE — 80061 LIPID PANEL: CPT

## 2020-06-16 ENCOUNTER — HOSPITAL ENCOUNTER (INPATIENT)
Age: 85
LOS: 3 days | Discharge: HOME HEALTH | DRG: 191 | End: 2020-06-19
Attending: EMERGENCY MEDICINE | Admitting: HOSPITALIST
Payer: MEDICARE

## 2020-06-16 ENCOUNTER — APPOINTMENT (OUTPATIENT)
Dept: CT IMAGING | Age: 85
DRG: 191 | End: 2020-06-16
Attending: PHYSICIAN ASSISTANT
Payer: MEDICARE

## 2020-06-16 ENCOUNTER — APPOINTMENT (OUTPATIENT)
Dept: GENERAL RADIOLOGY | Age: 85
DRG: 191 | End: 2020-06-16
Attending: PHYSICIAN ASSISTANT
Payer: MEDICARE

## 2020-06-16 DIAGNOSIS — Z20.822 SUSPECTED COVID-19 VIRUS INFECTION: Primary | ICD-10-CM

## 2020-06-16 DIAGNOSIS — R91.1 LUNG NODULE SEEN ON IMAGING STUDY: ICD-10-CM

## 2020-06-16 DIAGNOSIS — R06.00 DYSPNEA, UNSPECIFIED TYPE: ICD-10-CM

## 2020-06-16 DIAGNOSIS — J44.9 CHRONIC OBSTRUCTIVE PULMONARY DISEASE, UNSPECIFIED COPD TYPE (HCC): ICD-10-CM

## 2020-06-16 DIAGNOSIS — R06.82 TACHYPNEA: ICD-10-CM

## 2020-06-16 PROBLEM — J44.1 COPD EXACERBATION (HCC): Status: ACTIVE | Noted: 2020-06-16

## 2020-06-16 LAB
ALBUMIN SERPL-MCNC: 4 G/DL (ref 3.4–5)
ALBUMIN/GLOB SERPL: 0.8 {RATIO} (ref 0.8–1.7)
ALP SERPL-CCNC: 98 U/L (ref 45–117)
ALT SERPL-CCNC: 25 U/L (ref 16–61)
ANION GAP SERPL CALC-SCNC: 12 MMOL/L (ref 3–18)
AST SERPL-CCNC: 24 U/L (ref 10–38)
ATRIAL RATE: 74 BPM
BASOPHILS # BLD: 0 K/UL (ref 0–0.1)
BASOPHILS NFR BLD: 0 % (ref 0–2)
BILIRUB SERPL-MCNC: 0.6 MG/DL (ref 0.2–1)
BNP SERPL-MCNC: 426 PG/ML (ref 0–1800)
BUN SERPL-MCNC: 24 MG/DL (ref 7–18)
BUN/CREAT SERPL: 15 (ref 12–20)
CALCIUM SERPL-MCNC: 10.1 MG/DL (ref 8.5–10.1)
CALCULATED P AXIS, ECG09: -25 DEGREES
CALCULATED R AXIS, ECG10: -6 DEGREES
CALCULATED T AXIS, ECG11: 55 DEGREES
CHLORIDE SERPL-SCNC: 107 MMOL/L (ref 100–111)
CK MB CFR SERPL CALC: 2.2 % (ref 0–4)
CK MB CFR SERPL CALC: 2.5 % (ref 0–4)
CK MB SERPL-MCNC: 2.3 NG/ML (ref 5–25)
CK MB SERPL-MCNC: 2.8 NG/ML (ref 5–25)
CK SERPL-CCNC: 127 U/L (ref 39–308)
CK SERPL-CCNC: 91 U/L (ref 39–308)
CO2 SERPL-SCNC: 19 MMOL/L (ref 21–32)
CREAT SERPL-MCNC: 1.55 MG/DL (ref 0.6–1.3)
CRP SERPL-MCNC: <0.3 MG/DL (ref 0–0.3)
D DIMER PPP FEU-MCNC: 0.84 UG/ML(FEU)
DIAGNOSIS, 93000: NORMAL
DIFFERENTIAL METHOD BLD: ABNORMAL
EOSINOPHIL # BLD: 0.2 K/UL (ref 0–0.4)
EOSINOPHIL NFR BLD: 2 % (ref 0–5)
ERYTHROCYTE [DISTWIDTH] IN BLOOD BY AUTOMATED COUNT: 14.2 % (ref 11.6–14.5)
FERRITIN SERPL-MCNC: 95 NG/ML (ref 8–388)
FIBRINOGEN PPP-MCNC: 404 MG/DL (ref 210–451)
GLOBULIN SER CALC-MCNC: 4.8 G/DL (ref 2–4)
GLUCOSE SERPL-MCNC: 196 MG/DL (ref 74–99)
HCT VFR BLD AUTO: 37.5 % (ref 36–48)
HGB BLD-MCNC: 12.8 G/DL (ref 13–16)
LDH SERPL L TO P-CCNC: 271 U/L (ref 81–234)
LYMPHOCYTES # BLD: 2.7 K/UL (ref 0.9–3.6)
LYMPHOCYTES NFR BLD: 23 % (ref 21–52)
MAGNESIUM SERPL-MCNC: 1.9 MG/DL (ref 1.6–2.6)
MCH RBC QN AUTO: 27.2 PG (ref 24–34)
MCHC RBC AUTO-ENTMCNC: 34.1 G/DL (ref 31–37)
MCV RBC AUTO: 79.8 FL (ref 74–97)
MONOCYTES # BLD: 0.6 K/UL (ref 0.05–1.2)
MONOCYTES NFR BLD: 5 % (ref 3–10)
NEUTS SEG # BLD: 8.1 K/UL (ref 1.8–8)
NEUTS SEG NFR BLD: 70 % (ref 40–73)
P-R INTERVAL, ECG05: 142 MS
PLATELET # BLD AUTO: 256 K/UL (ref 135–420)
PMV BLD AUTO: 11.2 FL (ref 9.2–11.8)
POTASSIUM SERPL-SCNC: 3.6 MMOL/L (ref 3.5–5.5)
PROCALCITONIN SERPL-MCNC: <0.05 NG/ML
PROT SERPL-MCNC: 8.8 G/DL (ref 6.4–8.2)
Q-T INTERVAL, ECG07: 404 MS
QRS DURATION, ECG06: 90 MS
QTC CALCULATION (BEZET), ECG08: 448 MS
RBC # BLD AUTO: 4.7 M/UL (ref 4.7–5.5)
SODIUM SERPL-SCNC: 138 MMOL/L (ref 136–145)
TROPONIN I SERPL-MCNC: <0.02 NG/ML (ref 0–0.04)
TROPONIN I SERPL-MCNC: <0.02 NG/ML (ref 0–0.04)
VENTRICULAR RATE, ECG03: 74 BPM
WBC # BLD AUTO: 11.6 K/UL (ref 4.6–13.2)

## 2020-06-16 PROCEDURE — 74011636320 HC RX REV CODE- 636/320: Performed by: FAMILY MEDICINE

## 2020-06-16 PROCEDURE — 87635 SARS-COV-2 COVID-19 AMP PRB: CPT

## 2020-06-16 PROCEDURE — 93005 ELECTROCARDIOGRAM TRACING: CPT

## 2020-06-16 PROCEDURE — 84484 ASSAY OF TROPONIN QUANT: CPT

## 2020-06-16 PROCEDURE — 85384 FIBRINOGEN ACTIVITY: CPT

## 2020-06-16 PROCEDURE — 83615 LACTATE (LD) (LDH) ENZYME: CPT

## 2020-06-16 PROCEDURE — 65270000029 HC RM PRIVATE

## 2020-06-16 PROCEDURE — 84145 PROCALCITONIN (PCT): CPT

## 2020-06-16 PROCEDURE — 83605 ASSAY OF LACTIC ACID: CPT

## 2020-06-16 PROCEDURE — 80053 COMPREHEN METABOLIC PANEL: CPT

## 2020-06-16 PROCEDURE — 82728 ASSAY OF FERRITIN: CPT

## 2020-06-16 PROCEDURE — 87040 BLOOD CULTURE FOR BACTERIA: CPT

## 2020-06-16 PROCEDURE — 83735 ASSAY OF MAGNESIUM: CPT

## 2020-06-16 PROCEDURE — 99285 EMERGENCY DEPT VISIT HI MDM: CPT

## 2020-06-16 PROCEDURE — 74011250636 HC RX REV CODE- 250/636: Performed by: HOSPITALIST

## 2020-06-16 PROCEDURE — 86140 C-REACTIVE PROTEIN: CPT

## 2020-06-16 PROCEDURE — 85025 COMPLETE CBC W/AUTO DIFF WBC: CPT

## 2020-06-16 PROCEDURE — 74011250636 HC RX REV CODE- 250/636: Performed by: PHYSICIAN ASSISTANT

## 2020-06-16 PROCEDURE — 71275 CT ANGIOGRAPHY CHEST: CPT

## 2020-06-16 PROCEDURE — 83880 ASSAY OF NATRIURETIC PEPTIDE: CPT

## 2020-06-16 PROCEDURE — 74011636320 HC RX REV CODE- 636/320: Performed by: EMERGENCY MEDICINE

## 2020-06-16 PROCEDURE — 85379 FIBRIN DEGRADATION QUANT: CPT

## 2020-06-16 PROCEDURE — 74011000250 HC RX REV CODE- 250: Performed by: HOSPITALIST

## 2020-06-16 PROCEDURE — 71045 X-RAY EXAM CHEST 1 VIEW: CPT

## 2020-06-16 RX ORDER — TAMSULOSIN HYDROCHLORIDE 0.4 MG/1
0.4 CAPSULE ORAL DAILY
Status: DISCONTINUED | OUTPATIENT
Start: 2020-06-17 | End: 2020-06-19 | Stop reason: HOSPADM

## 2020-06-16 RX ORDER — IPRATROPIUM BROMIDE AND ALBUTEROL SULFATE 2.5; .5 MG/3ML; MG/3ML
3 SOLUTION RESPIRATORY (INHALATION)
Status: DISCONTINUED | OUTPATIENT
Start: 2020-06-16 | End: 2020-06-19 | Stop reason: HOSPADM

## 2020-06-16 RX ORDER — METOPROLOL TARTRATE 25 MG/1
25 TABLET, FILM COATED ORAL 2 TIMES DAILY
Status: DISCONTINUED | OUTPATIENT
Start: 2020-06-17 | End: 2020-06-19 | Stop reason: HOSPADM

## 2020-06-16 RX ORDER — SODIUM CHLORIDE 0.9 % (FLUSH) 0.9 %
5-40 SYRINGE (ML) INJECTION AS NEEDED
Status: DISCONTINUED | OUTPATIENT
Start: 2020-06-16 | End: 2020-06-19 | Stop reason: HOSPADM

## 2020-06-16 RX ORDER — ONDANSETRON 2 MG/ML
4 INJECTION INTRAMUSCULAR; INTRAVENOUS
Status: DISCONTINUED | OUTPATIENT
Start: 2020-06-16 | End: 2020-06-19 | Stop reason: HOSPADM

## 2020-06-16 RX ORDER — FOLIC ACID 1 MG/1
1 TABLET ORAL DAILY
Status: DISCONTINUED | OUTPATIENT
Start: 2020-06-17 | End: 2020-06-19 | Stop reason: HOSPADM

## 2020-06-16 RX ORDER — HEPARIN SODIUM 5000 [USP'U]/ML
5000 INJECTION, SOLUTION INTRAVENOUS; SUBCUTANEOUS EVERY 8 HOURS
Status: DISCONTINUED | OUTPATIENT
Start: 2020-06-17 | End: 2020-06-19 | Stop reason: HOSPADM

## 2020-06-16 RX ORDER — AMLODIPINE BESYLATE 5 MG/1
5 TABLET ORAL DAILY
Status: DISCONTINUED | OUTPATIENT
Start: 2020-06-17 | End: 2020-06-19 | Stop reason: HOSPADM

## 2020-06-16 RX ORDER — DOCUSATE SODIUM 100 MG/1
100 CAPSULE, LIQUID FILLED ORAL 2 TIMES DAILY
Status: DISCONTINUED | OUTPATIENT
Start: 2020-06-17 | End: 2020-06-19 | Stop reason: HOSPADM

## 2020-06-16 RX ORDER — IPRATROPIUM BROMIDE AND ALBUTEROL SULFATE 2.5; .5 MG/3ML; MG/3ML
3 SOLUTION RESPIRATORY (INHALATION) ONCE
Status: COMPLETED | OUTPATIENT
Start: 2020-06-16 | End: 2020-06-16

## 2020-06-16 RX ORDER — ALLOPURINOL 100 MG/1
100 TABLET ORAL DAILY
Status: DISCONTINUED | OUTPATIENT
Start: 2020-06-17 | End: 2020-06-19 | Stop reason: HOSPADM

## 2020-06-16 RX ORDER — ACETAMINOPHEN 325 MG/1
650 TABLET ORAL
Status: DISCONTINUED | OUTPATIENT
Start: 2020-06-16 | End: 2020-06-19 | Stop reason: HOSPADM

## 2020-06-16 RX ORDER — GLUCOSAM/CHONDRO/HERB 149/HYAL 750-100 MG
1 TABLET ORAL DAILY
Status: DISCONTINUED | OUTPATIENT
Start: 2020-06-17 | End: 2020-06-19 | Stop reason: HOSPADM

## 2020-06-16 RX ORDER — SODIUM CHLORIDE 0.9 % (FLUSH) 0.9 %
5-10 SYRINGE (ML) INJECTION AS NEEDED
Status: DISCONTINUED | OUTPATIENT
Start: 2020-06-16 | End: 2020-06-19 | Stop reason: HOSPADM

## 2020-06-16 RX ORDER — SODIUM CHLORIDE 0.9 % (FLUSH) 0.9 %
5-40 SYRINGE (ML) INJECTION EVERY 8 HOURS
Status: DISCONTINUED | OUTPATIENT
Start: 2020-06-16 | End: 2020-06-19 | Stop reason: HOSPADM

## 2020-06-16 RX ORDER — IODIXANOL 320 MG/ML
100 INJECTION, SOLUTION INTRAVASCULAR
Status: DISPENSED | OUTPATIENT
Start: 2020-06-16 | End: 2020-06-17

## 2020-06-16 RX ORDER — DUTASTERIDE 0.5 MG/1
0.5 CAPSULE, LIQUID FILLED ORAL DAILY
Status: DISCONTINUED | OUTPATIENT
Start: 2020-06-17 | End: 2020-06-19 | Stop reason: HOSPADM

## 2020-06-16 RX ORDER — GABAPENTIN 100 MG/1
100 CAPSULE ORAL 3 TIMES DAILY
Status: DISCONTINUED | OUTPATIENT
Start: 2020-06-17 | End: 2020-06-19 | Stop reason: HOSPADM

## 2020-06-16 RX ORDER — ATORVASTATIN CALCIUM 20 MG/1
10 TABLET, FILM COATED ORAL DAILY
Status: DISCONTINUED | OUTPATIENT
Start: 2020-06-17 | End: 2020-06-19 | Stop reason: HOSPADM

## 2020-06-16 RX ADMIN — AZITHROMYCIN MONOHYDRATE 500 MG: 500 INJECTION, POWDER, LYOPHILIZED, FOR SOLUTION INTRAVENOUS at 23:45

## 2020-06-16 RX ADMIN — IPRATROPIUM BROMIDE AND ALBUTEROL SULFATE 3 ML: .5; 3 SOLUTION RESPIRATORY (INHALATION) at 23:45

## 2020-06-16 RX ADMIN — METHYLPREDNISOLONE SODIUM SUCCINATE 125 MG: 125 INJECTION, POWDER, FOR SOLUTION INTRAMUSCULAR; INTRAVENOUS at 23:45

## 2020-06-16 RX ADMIN — CEFTRIAXONE SODIUM 2 G: 2 INJECTION, POWDER, FOR SOLUTION INTRAMUSCULAR; INTRAVENOUS at 23:44

## 2020-06-16 RX ADMIN — Medication 10 ML: at 23:40

## 2020-06-16 RX ADMIN — IOPAMIDOL 96 ML: 755 INJECTION, SOLUTION INTRAVENOUS at 19:47

## 2020-06-16 RX ADMIN — SODIUM CHLORIDE 500 ML: 900 INJECTION, SOLUTION INTRAVENOUS at 18:24

## 2020-06-16 RX ADMIN — IODIXANOL 80 ML: 320 INJECTION, SOLUTION INTRAVASCULAR at 18:50

## 2020-06-16 NOTE — ED PROVIDER NOTES
EMERGENCY DEPARTMENT HISTORY AND PHYSICAL EXAM    4:49 PM      Date: 6/16/2020  Patient Name: Randy Montague    History of Presenting Illness     Chief Complaint   Patient presents with    Shortness of Breath       History Provided By: Patient    Additional History (Context): Randy Montague is a 80 y.o. male with hx of BPH, gout, COPD on home oxygen, HTN, HLD who presents with c/o dyspnea x 3 days. Pt notes he is on 2L oxygen at home. Denies fever/chills, diaphoresis, chest pain, orthopnea, leg edema, abdominal pain, n/v/d. Denies sick contacts, recent travel, known exposure to [place]. PCP: Racheal Oconnor MD    Current Facility-Administered Medications   Medication Dose Route Frequency Provider Last Rate Last Dose    iodixanoL (VISIPAQUE) 320 mg iodine/mL contrast injection 100 mL  100 mL IntraVENous RAD Maurisio Simpson MD        albuterol-ipratropium (DUO-NEB) 2.5 MG-0.5 MG/3 ML  3 mL Nebulization Maurisio Simpson MD        methylPREDNISolone (PF) (Solu-MEDROL) injection 125 mg  125 mg IntraVENous NOW Elkin Haider MD        sodium chloride (NS) flush 5-10 mL  5-10 mL IntraVENous PRN Argelia Russell PA        cefTRIAXone (ROCEPHIN) 2 g in sterile water (preservative free) 20 mL IV syringe  2 g IntraVENous Q24H Niurka Russell PA        azithromycin (ZITHROMAX) 500 mg in  mL  500 mg IntraVENous Q24H Argelia Russell [place]        [START ON 6/17/2020] allopurinoL (ZYLOPRIM) tablet 100 mg  100 mg Oral DAILY Elkin Haider MD        [START ON 6/17/2020] amLODIPine (NORVASC) tablet 5 mg  5 mg Oral DAILY Elkin Haider MD        [START ON 6/17/2020] docusate sodium (COLACE) capsule 100 mg  100 mg Oral BID Elkin Haider MD        [START ON 6/17/2020] dutasteride (AVODART) capsule 0.5 mg  0.5 mg Oral DAILY Elkin Haider MD        . PHARMACY TO SUBSTITUTE PER PROTOCOL (Reordered from: fish oil-dha-epa (FISH OIL) 1,200-144-216 mg Cap)    Per Protocol Michael Lee Abby Stauffer MD       [address] [place] [address] Sakina Pruitt ON 3/43/[birth year]01] folic acid (FOLVITE) tablet 1 mg  1 mg Oral DAILY Deepthi Lemos MD        . PHARMACY TO SUBSTITUTE PER PROTOCOL (Reordered from: valsartan-hydrochlorothiazide (DIOVAN HCT) 160-12.5 mg per tablet)    Per Protocol Deepthi Lemos MD        [START ON 6/17/2020] tamsulosin (FLOMAX) capsule 0.4 mg  0.4 mg Oral DAILY Deepthi Lemos MD        [START ON 6/17/2020] atorvastatin (LIPITOR) tablet 10 mg  10 mg Oral DAILY Deepthi Lemos MD        [START ON 6/17/2020] metoprolol tartrate (LOPRESSOR) tablet 25 mg  25 mg Oral BID Deepthi Lemos MD        [START ON 6/17/2020] risperiDONE (RisperDAL) tablet 3 mg  3 mg Oral DAILY Deepthi Lemos MD        [START ON 6/17/2020] gabapentin (NEURONTIN) capsule 100 mg  100 mg Oral TID Deepthi Lemos MD        acetaminophen (TYLENOL) tablet 650 mg  650 mg Oral Q6H PRN Deepthi Lemos MD        ondansetron [facility]) injection 4 mg  4 mg IntraVENous Q6H PRN Deepthi Lemos MD        sodium chloride (NS) flush 5-40 mL  5-40 mL IntraVENous Q8H Deepthi Lemos MD        sodium chloride (NS) flush 5-40 mL  5-40 mL IntraVENous PRN Deepthi Lemos MD        heparin (porcine) injection 5,000 Units  5,000 Units SubCUTAneous Q8H Deepthi Lemos MD        [START ON 6/17/2020] methylPREDNISolone (PF) (Solu-MEDROL) injection 60 mg  60 mg IntraVENous Q6H Deepthi Lemos MD        albuterol-ipratropium (DUO-NEB) 2.5 MG-0.5 MG/3 ML  3 mL Nebulization Q4H PRN Deepthi Lemos MD        cefTRIAXone (ROCEPHIN) 2 g in sterile water (preservative free) 20 mL IV syringe  2 g IntraVENous Q24H Deepthi Lemos MD   2 g at 06/16/20 2344    azithromycin (ZITHROMAX) 500 mg in  mL  500 mg IntraVENous Q24H Deepthi Lemos MD         Current Outpatient Medications   Medication Sig Dispense Refill    tamsulosin (FLOMAX) 0.4 mg capsule Take 1 Cap by mouth daily.  90 Cap 3    dutasteride (AVODART) 0.5 mg capsule Take 1 Cap by mouth daily. 90 Cap 3    metoprolol tartrate (LOPRESSOR) 25 mg tablet Take 25 mg by mouth two (2) times a day.  metFORMIN (GLUCOPHAGE) 500 mg tablet Take  by mouth two (2) times daily (with meals).  allopurinol (ZYLOPRIM) 100 mg tablet Take 100 mg by mouth daily.  folic acid (FOLVITE) 1 mg tablet Take  by mouth daily.  docusate sodium (COLACE) 100 mg capsule Take 100 mg by mouth two (2) times a day.  amLODIPine (NORVASC) 5 mg tablet Take 5 mg by mouth daily.  gabapentin (NEURONTIN) 100 mg capsule Take  by mouth three (3) times daily.  simvastatin (ZOCOR) 20 mg tablet Take  by mouth nightly.  valsartan-hydrochlorothiazide (DIOVAN HCT) 160-12.5 mg per tablet Take 1 Tab by mouth daily.  fish oil-dha-epa (FISH OIL) 1,200-144-216 mg Cap Take  by mouth.  Magnesium Oxide 500 mg Cap Take  by mouth.  risperidone (RISPERDAL) 3 mg tablet Take  by mouth.  hydrocodone-acetaminophen (VICODIN ES) 7.5-750 mg per tablet Take  by mouth every six (6) hours as needed.          Past History     Past Medical History:  Past Medical History:   Diagnosis Date    Arthropathy, unspecified, other specified sites     Bone pain     BPH (benign prostatic hypertrophy)     BPH with obstruction/lower urinary tract symptoms     Cough     Fracture     right distal humerus     Gout     H/O seasonal allergies     Hypercholesterolemia     Hypertension     Obesity     Pain with swallowing     Retention of urine, unspecified     Right arm pain     SOB (shortness of breath)     Tattoo     Urinary retention        Past Surgical History:  Past Surgical History:   Procedure Laterality Date    COLONOSCOPY N/A 12/2/2016    COLONOSCOPY performed by Raul Terry MD at [address] HX ORTHOPAEDIC  12-    ORIF, right distal humerus fracture    HX OTHER SURGICAL      CYSTOSCOPY W PHOTOVAPORIZATION POST PROCEDURE ORDERSET 636    [place] COLSC FLX W/NDSC US XM RCTM ET AL LMTD&ADJ [facility]         Family History:  No family history on file. Social History:  Social History     Tobacco Use    Smoking status: Former Smoker    Smokeless tobacco: Former User   Substance Use Topics    Alcohol use: Yes     Comment: occasionally    Drug use: No       Allergies:  No Known Allergies      Review of Systems       Review of Systems   Constitutional: Negative for chills and fever. Respiratory: Positive for shortness of breath. Cardiovascular: Negative for chest pain. Gastrointestinal: Negative for abdominal pain, nausea and vomiting. Skin: Negative for rash. Neurological: Negative for weakness. All other systems reviewed and are negative. Physical Exam     Visit Vitals  BP (!) 162/97   Pulse 65   Temp 98.6 °F (37 °C)   Resp (!) 33   Ht 5' 9\" (1.753 m)   Wt 89.8 kg (198 lb)   SpO2 100%   BMI 29.24 kg/m²         Physical Exam  Vitals signs and nursing note reviewed. Constitutional:       General: He is not in acute distress. Appearance: He is not ill-appearing, toxic-appearing or diaphoretic. Comments: disheveled   HENT:      Head: Normocephalic and atraumatic. Neck:      Musculoskeletal: Normal range of motion and neck supple. Cardiovascular:      Rate and Rhythm: Normal rate and regular rhythm. Heart sounds: Normal heart sounds. No murmur. No friction rub. No gallop. Pulmonary:      Effort: Pulmonary effort is normal. Tachypnea present. No respiratory distress. Breath sounds: Normal breath sounds. No wheezing, rhonchi or rales. Musculoskeletal: Normal range of motion. Right lower leg: No edema. Left lower leg: No edema. Skin:     General: Skin is warm. Findings: No rash. Neurological:      General: No focal deficit present. Mental Status: He is alert and oriented to person, place, and time. Motor: No weakness.            Diagnostic Study Results     Labs -  Recent Results (from the past 12 hour(s)) CBC WITH AUTOMATED DIFF    Collection Time: 06/16/20  4:05 PM   Result Value Ref Range    WBC 11.6 4.6 - 13.2 K/uL    RBC 4.70 4.70 - 5.50 M/uL    HGB 12.8 (L) 13.0 - 16.0 g/dL    HCT 37.5 36.0 - 48.0 %    MCV 79.8 74.0 - 97.0 FL    MCH 27.2 24.0 - 34.0 PG    MCHC 34.1 31.0 - 37.0 g/dL    RDW 14.2 11.6 - 14.5 %    PLATELET 187 080 - 095 K/uL    MPV 11.2 9.2 - 11.8 FL    NEUTROPHILS 70 40 - 73 %    LYMPHOCYTES 23 21 - 52 %    MONOCYTES 5 3 - 10 %    EOSINOPHILS 2 0 - 5 %    BASOPHILS 0 0 - 2 %    ABS. NEUTROPHILS 8.1 (H) 1.8 - 8.0 K/UL    ABS. LYMPHOCYTES 2.7 0.9 - 3.6 K/UL    ABS. MONOCYTES 0.6 0.05 - 1.2 K/UL    ABS. EOSINOPHILS 0.2 0.0 - 0.4 K/UL    ABS. BASOPHILS 0.0 0.0 - 0.1 K/UL    DF AUTOMATED     METABOLIC PANEL, COMPREHENSIVE    Collection Time: 06/16/20  4:05 PM   Result Value Ref Range    Sodium 138 136 - 145 mmol/L    Potassium 3.6 3.5 - 5.5 mmol/L    Chloride 107 100 - 111 mmol/L    CO2 19 (L) 21 - 32 mmol/L    Anion gap 12 3.0 - 18 mmol/L    Glucose 196 (H) 74 - 99 mg/dL    BUN 24 (H) 7.0 - 18 MG/DL    Creatinine 1.55 (H) 0.6 - 1.3 MG/DL    BUN/Creatinine ratio 15 12 - 20      GFR est AA 52 (L) >60 ml/min/1.73m2    GFR est non-AA 43 (L) >60 ml/min/1.73m2    Calcium 10.1 8.5 - 10.1 MG/DL    Bilirubin, total 0.6 0.2 - 1.0 MG/DL    ALT (SGPT) 25 16 - 61 U/L    AST (SGOT) 24 10 - 38 U/L    Alk.  phosphatase 98 45 - 117 U/L    Protein, total 8.8 (H) 6.4 - 8.2 g/dL    Albumin 4.0 3.4 - 5.0 g/dL    Globulin 4.8 (H) 2.0 - 4.0 g/dL    A-G Ratio 0.8 0.8 - 1.7     CARDIAC PANEL,(CK, CKMB & TROPONIN)    Collection Time: 06/16/20  4:05 PM   Result Value Ref Range    CK - MB 2.8 <3.6 ng/ml    CK-MB Index 2.2 0.0 - 4.0 %     39 - 308 U/L    Troponin-I, QT <0.02 0.0 - 0.045 NG/ML   NT-PRO BNP    Collection Time: 06/16/20  4:05 PM   Result Value Ref Range    NT pro- 0 - 1,800 PG/ML   D DIMER    Collection Time: 06/16/20  4:05 PM   Result Value Ref Range    D DIMER 0.84 (H) <0.46 ug/ml(FEU)   PROCALCITONIN Collection Time: 06/16/20  4:05 PM   Result Value Ref Range    Procalcitonin <0.05 ng/mL   MAGNESIUM    Collection Time: 06/16/20  4:05 PM   Result Value Ref Range    Magnesium 1.9 1.6 - 2.6 mg/dL   FIBRINOGEN    Collection Time: 06/16/20  4:05 PM   Result Value Ref Range    Fibrinogen 404 210 - 451 mg/dL   C REACTIVE PROTEIN, QT    Collection Time: 06/16/20  4:05 PM   Result Value Ref Range    C-Reactive protein <0.3 0 - 0.3 mg/dL   LD    Collection Time: 06/16/20  4:05 PM   Result Value Ref Range     (H) 81 - 234 U/L   FERRITIN    Collection Time: 06/16/20  4:05 PM   Result Value Ref Range    Ferritin 95 8 - 388 NG/ML   EKG, 12 LEAD, INITIAL    Collection Time: 06/16/20  5:02 PM   Result Value Ref Range    Ventricular Rate 74 BPM    Atrial Rate 74 BPM    P-R Interval 142 ms    QRS Duration 90 ms    Q-T Interval 404 ms    QTC Calculation (Bezet) 448 ms    Calculated P Axis -25 degrees    Calculated R Axis -6 degrees    Calculated T Axis 55 degrees    Diagnosis       Normal sinus rhythm  Nonspecific ST and T wave abnormality  Abnormal ECG  When compared with ECG of 14-FEB-2019 13:33,  Nonspecific T wave abnormality now evident in Inferior leads  Confirmed by Ila Collier ([id number]) on 6/16/2020 10:10:04 PM     CARDIAC PANEL,(CK, CKMB & TROPONIN)    Collection Time: 06/16/20  7:12 PM   Result Value Ref Range    CK - MB 2.3 <3.6 ng/ml    CK-MB Index 2.5 0.0 - 4.0 %    CK 91 39 - 308 U/L    Troponin-I, QT <0.02 0.0 - 0.045 NG/ML       Radiologic Studies -   CTA CHEST W OR W WO CONT   Final Result   IMPRESSION:   1. No evidence of pulmonary metastatic dissection. 2.  7 mm groundglass pulmonary nodule with additional right upper lobe pulmonary   nodules. Consider short-term follow-up to document stability in 3 months. 3.  COPD changes. 4.  Large hiatal hernia. 5.  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                 XR CHEST PORT   Final Result   IMPRESSION:       Negative chest.      Thank you for this referral.            Medical Decision Making   I am the first provider for this patient. I reviewed the vital signs, available nursing notes, past medical history, past surgical history, family history and social history. Vital Signs-Reviewed the patient's vital signs. Pulse Oximetry Analysis -  100 on room air       EKG: Interpreted by the EP. Time Interpreted: 5724   Rate: 74   Rhythm: normal sinus rhythm, non-specific T wave   Interpretation: no ischemic changes   Comparison: 2/14/19    Records Reviewed: Nursing Notes, Old Medical Records and Previous electrocardiograms (Time of Review: 4:49 PM)    ED Course: Progress Notes, Reevaluation, and Consults:  4:58 PM: Discussed case with daughter, Dustin Johnson. Notes dyspnea x 3 days. No other complaints. On home oxygen, 2L.   7:00 PM: Pt with persistent tachypnea. Updated daughter with results up to this point. CONSULT NOTE:   11:00 PM  I spoke with Dr. Honey To   Specialty: Hospitalist  Discussed pt's hx, disposition, and available diagnostic and imaging results. Reviewed care plans. Consulting physician agrees with plans as outlined. Accepts patient for admission. Would recommend Solumedrol, duoneb, and abx. Written by Marisabel Pratt PA-C    Provider Notes (Medical Decision Making): 77-year-old male with history of COPD who presents to the ED due to shortness of breath x3 days. Patient is afebrile, nontoxic. Patient with persistent tachypnea throughout visit, RR between 22 and 39. EKG without evidence of ischemia, troponin negative x2. BNP within normal limits. CTA chest without evidence of PE, ACS, or aortic dissection. COVID test sent and pending. Will admit to hospitalist for further evaluation for possible COPD exacerbation vs COVID infection. For Hospitalized Patients:    1.  Hospitalization Decision Time:  The decision to hospitalize the patient was made by Dr. Jaqueline Mcdonald and Marisabel Pratt at 9:30 PM on 6/16/2020    2. Aspirin: Aspirin was not given because the patient did not present with a stroke at the time of their Emergency Department evaluation    Diagnosis     Clinical Impression:   1. Suspected COVID-19 virus infection    2. Tachypnea    3. Dyspnea, unspecified type    4. Lung nodule seen on imaging study    5. Chronic obstructive pulmonary disease, unspecified [facility] type ([facility]        Disposition: admission     Follow-up Information    None          Patient's Medications   Start Taking    No medications on file   Continue Taking    ALLOPURINOL (ZYLOPRIM) 100 MG TABLET    Take 100 mg by mouth daily. AMLODIPINE (NORVASC) 5 MG TABLET    Take 5 mg by mouth daily. DOCUSATE SODIUM (COLACE) 100 MG CAPSULE    Take 100 mg by mouth two (2) times a day. DUTASTERIDE (AVODART) 0.5 MG CAPSULE    Take 1 Cap by mouth daily. FISH OIL-DHA-EPA (FISH OIL) 1,200-144-216 MG CAP    Take  by mouth. FOLIC ACID (FOLVITE) 1 MG TABLET    Take  by mouth daily. GABAPENTIN (NEURONTIN) 100 MG CAPSULE    Take  by mouth three (3) times daily. HYDROCODONE-ACETAMINOPHEN (VICODIN ES) 7.5-750 MG PER TABLET    Take  by mouth every six (6) hours as needed. MAGNESIUM OXIDE 500 MG CAP    Take  by mouth. METFORMIN (GLUCOPHAGE) 500 MG TABLET    Take  by mouth two (2) times daily (with meals). METOPROLOL TARTRATE (LOPRESSOR) 25 MG TABLET    Take 25 mg by mouth two (2) times a day. RISPERIDONE (RISPERDAL) 3 MG TABLET    Take  by mouth. SIMVASTATIN (ZOCOR) 20 MG TABLET    Take  by mouth nightly. TAMSULOSIN (FLOMAX) 0.4 MG CAPSULE    Take 1 Cap by mouth daily. VALSARTAN-HYDROCHLOROTHIAZIDE (DIOVAN HCT) 160-12.5 MG PER TABLET    Take 1 Tab by mouth daily. These Medications have changed    No medications on file   Stop Taking    No medications on file       Dictation disclaimer:  Please note that this dictation was completed with [employer], the [employer] voice recognition software.   Quite often unanticipated grammatical, syntax, homophones, and other interpretive errors are inadvertently transcribed by the computer software. Please disregard these errors. Please excuse any errors that have escaped final proofreading.

## 2020-06-16 NOTE — PROGRESS NOTES
Pt's IV infiltrated during CT of chest on the left arm in the [facility]. New IV was started on the Right AC. Radiologist gave the ok to give pt more contrast for study.

## 2020-06-16 NOTE — ED TRIAGE NOTES
Pt with SOB x 3 days. Daughter reports that pt is on home O2 at 2 L continuously with no relief. Pt with hx of COPD.

## 2020-06-17 LAB
ALBUMIN SERPL-MCNC: 3.2 G/DL (ref 3.4–5)
ALBUMIN/GLOB SERPL: 0.8 {RATIO} (ref 0.8–1.7)
ALP SERPL-CCNC: 82 U/L (ref 45–117)
ALT SERPL-CCNC: 20 U/L (ref 16–61)
ANION GAP SERPL CALC-SCNC: 10 MMOL/L (ref 3–18)
APTT PPP: 25.4 SEC (ref 23–36.4)
AST SERPL-CCNC: 18 U/L (ref 10–38)
BILIRUB SERPL-MCNC: 0.5 MG/DL (ref 0.2–1)
BUN SERPL-MCNC: 22 MG/DL (ref 7–18)
BUN/CREAT SERPL: 18 (ref 12–20)
CALCIUM SERPL-MCNC: 8.6 MG/DL (ref 8.5–10.1)
CHLORIDE SERPL-SCNC: 111 MMOL/L (ref 100–111)
CHOLEST SERPL-MCNC: 200 MG/DL
CO2 SERPL-SCNC: 19 MMOL/L (ref 21–32)
CREAT SERPL-MCNC: 1.2 MG/DL (ref 0.6–1.3)
ERYTHROCYTE [DISTWIDTH] IN BLOOD BY AUTOMATED COUNT: 14.4 % (ref 11.6–14.5)
FIBRINOGEN PPP-MCNC: 242 MG/DL (ref 210–451)
GLOBULIN SER CALC-MCNC: 4 G/DL (ref 2–4)
GLUCOSE SERPL-MCNC: 182 MG/DL (ref 74–99)
HCT VFR BLD AUTO: 34.4 % (ref 36–48)
HDLC SERPL-MCNC: 38 MG/DL (ref 40–60)
HDLC SERPL: 5.3 {RATIO} (ref 0–5)
HGB BLD-MCNC: 11.6 G/DL (ref 13–16)
LACTATE SERPL-SCNC: 2.9 MMOL/L (ref 0.4–2)
LDLC SERPL CALC-MCNC: 141 MG/DL (ref 0–100)
LIPID PROFILE,FLP: ABNORMAL
MCH RBC QN AUTO: 27.1 PG (ref 24–34)
MCHC RBC AUTO-ENTMCNC: 33.7 G/DL (ref 31–37)
MCV RBC AUTO: 80.4 FL (ref 74–97)
PLATELET # BLD AUTO: 197 K/UL (ref 135–420)
PMV BLD AUTO: 10.9 FL (ref 9.2–11.8)
POTASSIUM SERPL-SCNC: 3.7 MMOL/L (ref 3.5–5.5)
PROT SERPL-MCNC: 7.2 G/DL (ref 6.4–8.2)
RBC # BLD AUTO: 4.28 M/UL (ref 4.7–5.5)
SARS-COV-2, COV2NT: NOT DETECTED
SODIUM SERPL-SCNC: 140 MMOL/L (ref 136–145)
TRIGL SERPL-MCNC: 105 MG/DL (ref ?–150)
VLDLC SERPL CALC-MCNC: 21 MG/DL
WBC # BLD AUTO: 10.7 K/UL (ref 4.6–13.2)

## 2020-06-17 PROCEDURE — 85027 COMPLETE CBC AUTOMATED: CPT

## 2020-06-17 PROCEDURE — 80053 COMPREHEN METABOLIC PANEL: CPT

## 2020-06-17 PROCEDURE — 80061 LIPID PANEL: CPT

## 2020-06-17 PROCEDURE — 85730 THROMBOPLASTIN TIME PARTIAL: CPT

## 2020-06-17 PROCEDURE — 74011250636 HC RX REV CODE- 250/636: Performed by: HOSPITALIST

## 2020-06-17 PROCEDURE — 74011250637 HC RX REV CODE- 250/637: Performed by: HOSPITALIST

## 2020-06-17 PROCEDURE — 74011000250 HC RX REV CODE- 250: Performed by: HOSPITALIST

## 2020-06-17 PROCEDURE — 65660000000 HC RM CCU STEPDOWN

## 2020-06-17 PROCEDURE — 85384 FIBRINOGEN ACTIVITY: CPT

## 2020-06-17 RX ORDER — VALSARTAN 40 MG/1
160 TABLET ORAL DAILY
Status: DISCONTINUED | OUTPATIENT
Start: 2020-06-17 | End: 2020-06-19 | Stop reason: HOSPADM

## 2020-06-17 RX ORDER — IODIXANOL 320 MG/ML
80 INJECTION, SOLUTION INTRAVASCULAR
Status: COMPLETED | OUTPATIENT
Start: 2020-06-17 | End: 2020-06-16

## 2020-06-17 RX ORDER — HYDROCHLOROTHIAZIDE 25 MG/1
12.5 TABLET ORAL DAILY
Status: DISCONTINUED | OUTPATIENT
Start: 2020-06-17 | End: 2020-06-19 | Stop reason: HOSPADM

## 2020-06-17 RX ADMIN — METOPROLOL TARTRATE 25 MG: 25 TABLET, FILM COATED ORAL at 17:40

## 2020-06-17 RX ADMIN — DOCUSATE SODIUM 100 MG: 100 CAPSULE, LIQUID FILLED ORAL at 17:40

## 2020-06-17 RX ADMIN — Medication 10 ML: at 23:04

## 2020-06-17 RX ADMIN — GABAPENTIN 100 MG: 100 CAPSULE ORAL at 08:09

## 2020-06-17 RX ADMIN — METHYLPREDNISOLONE SODIUM SUCCINATE 60 MG: 125 INJECTION, POWDER, FOR SOLUTION INTRAMUSCULAR; INTRAVENOUS at 23:04

## 2020-06-17 RX ADMIN — ATORVASTATIN CALCIUM 10 MG: 20 TABLET, FILM COATED ORAL at 08:09

## 2020-06-17 RX ADMIN — DUTASTERIDE 0.5 MG: 0.5 CAPSULE, LIQUID FILLED ORAL at 12:04

## 2020-06-17 RX ADMIN — METHYLPREDNISOLONE SODIUM SUCCINATE 60 MG: 125 INJECTION, POWDER, FOR SOLUTION INTRAMUSCULAR; INTRAVENOUS at 17:40

## 2020-06-17 RX ADMIN — TAMSULOSIN HYDROCHLORIDE 0.4 MG: 0.4 CAPSULE ORAL at 08:09

## 2020-06-17 RX ADMIN — HEPARIN SODIUM 5000 UNITS: 5000 INJECTION INTRAVENOUS; SUBCUTANEOUS at 08:11

## 2020-06-17 RX ADMIN — VALSARTAN 160 MG: 160 TABLET, FILM COATED ORAL at 10:16

## 2020-06-17 RX ADMIN — ALLOPURINOL 100 MG: 100 TABLET ORAL at 08:09

## 2020-06-17 RX ADMIN — CEFTRIAXONE SODIUM 2 G: 2 INJECTION, POWDER, FOR SOLUTION INTRAMUSCULAR; INTRAVENOUS at 23:03

## 2020-06-17 RX ADMIN — RISPERIDONE 3 MG: 2 TABLET ORAL at 12:04

## 2020-06-17 RX ADMIN — GABAPENTIN 100 MG: 100 CAPSULE ORAL at 22:59

## 2020-06-17 RX ADMIN — METOPROLOL TARTRATE 25 MG: 25 TABLET, FILM COATED ORAL at 08:10

## 2020-06-17 RX ADMIN — FOLIC ACID 1 MG: 1 TABLET ORAL at 08:09

## 2020-06-17 RX ADMIN — HEPARIN SODIUM 5000 UNITS: 5000 INJECTION INTRAVENOUS; SUBCUTANEOUS at 16:08

## 2020-06-17 RX ADMIN — GABAPENTIN 100 MG: 100 CAPSULE ORAL at 16:08

## 2020-06-17 RX ADMIN — Medication 10 ML: at 14:47

## 2020-06-17 RX ADMIN — METHYLPREDNISOLONE SODIUM SUCCINATE 60 MG: 125 INJECTION, POWDER, FOR SOLUTION INTRAMUSCULAR; INTRAVENOUS at 12:08

## 2020-06-17 RX ADMIN — AMLODIPINE BESYLATE 5 MG: 5 TABLET ORAL at 08:09

## 2020-06-17 RX ADMIN — AZITHROMYCIN MONOHYDRATE 500 MG: 500 INJECTION, POWDER, LYOPHILIZED, FOR SOLUTION INTRAVENOUS at 23:00

## 2020-06-17 RX ADMIN — OMEGA-3 FATTY ACIDS CAP 1000 MG 1 CAPSULE: 1000 CAP at 10:17

## 2020-06-17 RX ADMIN — HYDROCHLOROTHIAZIDE 12.5 MG: 25 TABLET ORAL at 10:16

## 2020-06-17 NOTE — PROGRESS NOTES
Problem: Falls - Risk of  Goal: *Absence of Falls  Description: Document Wero Mendez Fall Risk and appropriate interventions in the flowsheet. Outcome: Progressing Towards Goal  Note: Fall Risk Interventions:  Mobility Interventions: Bed/chair exit alarm, Patient to call before getting OOB         Medication Interventions: Evaluate medications/consider consulting pharmacy, Bed/chair exit alarm, Patient to call before getting OOB, Teach patient to arise slowly    Elimination Interventions: Call light in reach, Bed/chair exit alarm, Patient to call for help with toileting needs, Toileting schedule/hourly rounds, Toilet paper/wipes in reach, Urinal in reach              Problem: Patient Education: Go to Patient Education Activity  Goal: Patient/Family Education  Outcome: Progressing Towards Goal     Problem: Pressure Injury - Risk of  Goal: *Prevention of pressure injury  Description: Document Brendan Scale and appropriate interventions in the flowsheet.   Outcome: Progressing Towards Goal  Note: Pressure Injury Interventions:  Sensory Interventions: Assess changes in LOC, Keep linens dry and wrinkle-free, Pressure redistribution bed/mattress (bed type)    Moisture Interventions: Absorbent underpads    Activity Interventions: Increase time out of bed, Pressure redistribution bed/mattress(bed type)    Mobility Interventions: Assess need for specialty bed, HOB 30 degrees or less, Pressure redistribution bed/mattress (bed type)    Nutrition Interventions: Document food/fluid/supplement intake    Friction and Shear Interventions: HOB 30 degrees or less, Apply protective barrier, creams and emollients                Problem: Patient Education: Go to Patient Education Activity  Goal: Patient/Family Education  Outcome: Progressing Towards Goal

## 2020-06-17 NOTE — ACP (ADVANCE CARE PLANNING)
Advance Care Planning     Advance Care Planning Clinical Specialist  Conversation Note      Date of ACP Conversation: 6/16/2020    Conversation Conducted with:   Patient with Decision Making Capacity    ACP Clinical Specialist: Dhara Enriquez    *When Decision Maker makes decisions on behalf of the incapacitated patient: Decision Maker is asked to consider and make decisions based on patient values, known preferences, or best interests. Health Care Decision Maker:    Current Designated Health Care Decision Maker:   Primary Decision Maker: Rupesh Ortega - Daughter - [phone]  (If there is a [address] named in the \"Healthcare Decision Makers\" box in the ACP activity, but it is not visible above, be sure to open that field and then select the health care decision maker relationship (ie \"primary\") in the blank space to the right of the name.) Validate  this information as still accurate & up-to-date; edit [place] field as needed.)    Note: Assess and validate information in current ACP documents, as indicated. If no Decision Maker listed above or available through scanned documents, then:    If no Authorized Decision Maker has previously been identified, then patient chooses [place]:  \"Who would you like to name as your primary health care decision-maker? \"    Name: Salma Kee   Relationship: DAUGHTER  Phone number: [phone]  Ace Jeri this person be reached easily? \" YES      Note: If the relationship of these Decision-Makers to the patient does NOT follow your state's Next of Kin hierarchy, recommend that patient complete ACP document that meets state-specific requirements to allow them to act on the patient's behalf when appropriate. Care Preferences    Hospitalization: \"If your health worsens and it becomes clear that your chance of recovery is unlikely, what would your preference be regarding hospitalization? \"    Choice:  [x]  The patient wants hospitalization  []  The patient prefers comfort-focused treatment without hospitalization.         Length of ACP Conversation in minutes:      Conversation Outcomes:  [] ACP discussion completed  [] Existing advance directive reviewed with patient; no changes to patient's previously recorded wishes   [] New Advance Directive completed   [] Portable Do Not Rescitate prepared for Provider review and signature  [] POLST/POST/MOLST/MOST prepared for Provider review and signature      Follow-up plan:    [x] Schedule follow-up conversation to continue planning  [] Referred individual to Provider for additional questions/concerns   [] Advised patient/agent/surrogate to review completed ACP document and update if needed with changes in condition, patient preferences or care setting     [] This note routed to one or more involved healthcare providers    Antionette Zhong RN BSN  Care Manager  [phone]

## 2020-06-17 NOTE — ED NOTES
Pt resting and otherwise stable. Pt denies pain or complaints. Pt states he wants to go home. Both of patient's daughters consulted and they are requesting admission. Pt is unkept and appears that he has not bathed or cleansed himself in some time. When patient asked about the discolored beard and stains he said \"it's just food\". Pt updated on plan of care. Potential admission pending.

## 2020-06-17 NOTE — PROGRESS NOTES
[facility]  Progress Note    Patient: Ariana Wren Age: 80 y.o. : 1935 MR#: [id number] SSN: [id number]  Date: 2020    Subjective/24-hour events:     SOB improved, no new issues overnight. Assessment:   COPD with acute exacerbation  Suspected COVID-19 infection  Acute renal insufficiency, improved  Hypertension  Gout  BPH    Plan:  Continue current management. ABX, steroid, BD therapy. Await novel coronavirus testing. Home meds as previously prescribed. Supportive care o/w. Follow. Case discussed with:  [x]Patient  []Family  [x]Nursing  [x]Case Management  DVT Prophylaxis:  []Lovenox  []Hep SQ  []SCDs  []Coumadin   []On Heparin gtt    Objective:   VS:   Visit Vitals  /85 (BP 1 Location: Left arm, BP Patient Position: At rest)   Pulse 77   Temp 98.2 °F (36.8 °C)   Resp 18   Ht 5' 9\" (1.753 m)   Wt 89.8 kg (198 lb)   SpO2 100%   BMI 29.24 kg/m²      Tmax/24hrs: Temp (24hrs), Av.3 °F (36.8 °C), Min:98.1 °F (36.7 °C), Max:98.6 °F (37 °C)      Intake/Output Summary (Last 24 hours) at 2020 1103  Last data filed at 2020 0022  Gross per 24 hour   Intake 500 ml   Output 350 ml   Net 150 ml       General:  In NAD. Cardiovascular:  RRR. Pulmonary:  Decreased BS throughout, no accessory muscle use. GI:  Abdomen soft, NTTP. Extremities:  Warm, no ischemia. Neuro:   Moves extremities pontaneously, motor nonfocal.    Labs:    Recent Results (from the past 24 hour(s))   CBC WITH AUTOMATED DIFF    Collection Time: 20  4:05 PM   Result Value Ref Range    WBC 11.6 4.6 - 13.2 K/uL    RBC 4.70 4.70 - 5.50 M/uL    HGB 12.8 (L) 13.0 - 16.0 g/dL    HCT 37.5 36.0 - 48.0 %    MCV 79.8 74.0 - 97.0 FL    MCH 27.2 24.0 - 34.0 PG    MCHC 34.1 31.0 - 37.0 g/dL    RDW 14.2 11.6 - 14.5 %    PLATELET 295 833 - 734 K/uL    MPV 11.2 9.2 - 11.8 FL    NEUTROPHILS 70 40 - 73 %    LYMPHOCYTES 23 21 - 52 %    MONOCYTES 5 3 - 10 %    EOSINOPHILS 2 0 - 5 % BASOPHILS 0 0 - 2 %    ABS. NEUTROPHILS 8.1 (H) 1.8 - 8.0 K/UL    ABS. LYMPHOCYTES 2.7 0.9 - 3.6 K/UL    ABS. MONOCYTES 0.6 0.05 - 1.2 K/UL    ABS. EOSINOPHILS 0.2 0.0 - 0.4 K/UL    ABS. BASOPHILS 0.0 0.0 - 0.1 K/UL    DF AUTOMATED     METABOLIC PANEL, COMPREHENSIVE    Collection Time: 06/16/20  4:05 PM   Result Value Ref Range    Sodium 138 136 - 145 mmol/L    Potassium 3.6 3.5 - 5.5 mmol/L    Chloride 107 100 - 111 mmol/L    CO2 19 (L) 21 - 32 mmol/L    Anion gap 12 3.0 - 18 mmol/L    Glucose 196 (H) 74 - 99 mg/dL    BUN 24 (H) 7.0 - 18 MG/DL    Creatinine 1.55 (H) 0.6 - 1.3 MG/DL    BUN/Creatinine ratio 15 12 - 20      GFR est AA 52 (L) >60 ml/min/1.73m2    GFR est non-AA 43 (L) >60 ml/min/1.73m2    Calcium 10.1 8.5 - 10.1 MG/DL    Bilirubin, total 0.6 0.2 - 1.0 MG/DL    ALT (SGPT) 25 16 - 61 U/L    AST (SGOT) 24 10 - 38 U/L    Alk.  phosphatase 98 45 - 117 U/L    Protein, total 8.8 (H) 6.4 - 8.2 g/dL    Albumin 4.0 3.4 - 5.0 g/dL    Globulin 4.8 (H) 2.0 - 4.0 g/dL    A-G Ratio 0.8 0.8 - 1.7     CARDIAC PANEL,(CK, CKMB & TROPONIN)    Collection Time: 06/16/20  4:05 PM   Result Value Ref Range    CK - MB 2.8 <3.6 ng/ml    CK-MB Index 2.2 0.0 - 4.0 %     39 - 308 U/L    Troponin-I, QT <0.02 0.0 - 0.045 NG/ML   NT-PRO BNP    Collection Time: 06/16/20  4:05 PM   Result Value Ref Range    NT pro- 0 - 1,800 PG/ML   D DIMER    Collection Time: 06/16/20  4:05 PM   Result Value Ref Range    D DIMER 0.84 (H) <0.46 ug/ml(FEU)   PROCALCITONIN    Collection Time: 06/16/20  4:05 PM   Result Value Ref Range    Procalcitonin <0.05 ng/mL   MAGNESIUM    Collection Time: 06/16/20  4:05 PM   Result Value Ref Range    Magnesium 1.9 1.6 - 2.6 mg/dL   FIBRINOGEN    Collection Time: 06/16/20  4:05 PM   Result Value Ref Range    Fibrinogen 404 210 - 451 mg/dL   C REACTIVE PROTEIN, QT    Collection Time: 06/16/20  4:05 PM   Result Value Ref Range    C-Reactive protein <0.3 0 - 0.3 mg/dL   LD    Collection Time: 06/16/20  4:05 PM   Result Value Ref Range     (H) 81 - 234 U/L   FERRITIN    Collection Time: 06/16/20  4:05 PM   Result Value Ref Range    Ferritin 95 8 - 388 NG/ML   EKG, 12 LEAD, INITIAL    Collection Time: 06/16/20  5:02 PM   Result Value Ref Range    Ventricular Rate 74 BPM    Atrial Rate 74 BPM    P-R Interval 142 ms    QRS Duration 90 ms    Q-T Interval 404 ms    QTC Calculation (Bezet) 448 ms    Calculated P Axis -25 degrees    Calculated R Axis -6 degrees    Calculated T Axis 55 degrees    Diagnosis       Normal sinus rhythm  Nonspecific ST and T wave abnormality  Abnormal ECG  When compared with ECG of 14-FEB-2019 13:33,  Nonspecific T wave abnormality now evident in Inferior leads  Confirmed by Aisha Mitchell ([id number]) on 6/16/2020 10:10:04 PM     CARDIAC PANEL,(CK, CKMB & TROPONIN)    Collection Time: 06/16/20  7:12 PM   Result Value Ref Range    CK - MB 2.3 <3.6 ng/ml    CK-MB Index 2.5 0.0 - 4.0 %    CK 91 39 - 308 U/L    Troponin-I, QT <0.02 0.0 - 0.045 NG/ML   CULTURE, BLOOD    Collection Time: 06/16/20 11:00 PM   Result Value Ref Range    Special Requests: NO SPECIAL REQUESTS      Culture result: NO GROWTH AFTER 6 HOURS     CULTURE, BLOOD    Collection Time: 06/16/20 11:00 PM   Result Value Ref Range    Special Requests: NO SPECIAL REQUESTS      Culture result: NO GROWTH AFTER 6 HOURS     LACTIC ACID    Collection Time: 06/16/20 11:50 PM   Result Value Ref Range    Lactic acid 2.9 (HH) 0.4 - 2.0 MMOL/L   FIBRINOGEN    Collection Time: 06/17/20  3:40 AM   Result Value Ref Range    Fibrinogen 242 210 - 050 mg/dL   METABOLIC PANEL, COMPREHENSIVE    Collection Time: 06/17/20  3:40 AM   Result Value Ref Range    Sodium 140 136 - 145 mmol/L    Potassium 3.7 3.5 - 5.5 mmol/L    Chloride 111 100 - 111 mmol/L    CO2 19 (L) 21 - 32 mmol/L    Anion gap 10 3.0 - 18 mmol/L    Glucose 182 (H) 74 - 99 mg/dL    BUN 22 (H) 7.0 - 18 MG/DL    Creatinine 1.20 0.6 - 1.3 MG/DL    BUN/Creatinine ratio 18 12 - 20      GFR est AA >60 >60 ml/min/1.73m2    GFR est non-AA 58 (L) >60 ml/min/1.73m2    Calcium 8.6 8.5 - 10.1 MG/DL    Bilirubin, total 0.5 0.2 - 1.0 MG/DL    ALT (SGPT) 20 16 - 61 U/L    AST (SGOT) 18 10 - 38 U/L    Alk.  phosphatase 82 45 - 117 U/L    Protein, total 7.2 6.4 - 8.2 g/dL    Albumin 3.2 (L) 3.4 - 5.0 g/dL    Globulin 4.0 2.0 - 4.0 g/dL    A-G Ratio 0.8 0.8 - 1.7     LIPID PANEL    Collection Time: 06/17/20  3:40 AM   Result Value Ref Range    LIPID PROFILE          Cholesterol, total 200 (H) <200 MG/DL    Triglyceride 105 <150 MG/DL    HDL Cholesterol 38 (L) 40 - 60 MG/DL    LDL, calculated 141 (H) 0 - 100 MG/DL    VLDL, calculated 21 MG/DL    CHOL/HDL Ratio 5.3 (H) 0 - 5.0     PTT    Collection Time: 06/17/20  3:40 AM   Result Value Ref Range    aPTT 25.4 23.0 - 36.4 SEC   CBC W/O DIFF    Collection Time: 06/17/20  4:00 AM   Result Value Ref Range    WBC 10.7 4.6 - 13.2 K/uL    RBC 4.28 (L) 4.70 - 5.50 M/uL    HGB 11.6 (L) 13.0 - 16.0 g/dL    HCT 34.4 (L) 36.0 - 48.0 %    MCV 80.4 74.0 - 97.0 FL    MCH 27.1 24.0 - 34.0 PG    MCHC 33.7 31.0 - 37.0 g/dL    RDW 14.4 11.6 - 14.5 %    PLATELET 447 666 - 550 K/uL    MPV 10.9 9.2 - 11.8 FL       Signed By: Varun Barry MD     June 17, 2020

## 2020-06-17 NOTE — PROGRESS NOTES
Patient is not available to be assessed at this time. No family at bedside.     [address] [place] [address]   Board Certified [address] [place] [address]   [phone]

## 2020-06-17 NOTE — H&P
HISTORY & PHYSICAL      Patient: González Bella MRN: [id number]  [facility]: [id number]    YOB: 1935  Age: 80 y.o. Sex: male    DOA: 6/16/2020 LOS:  LOS: 0 days        DOA: 6/16/2020        Assessment/Plan     Active Problems:    Tachypnea (6/16/2020)      Dyspnea (6/16/2020)      Suspected COVID-19 virus infection (6/16/2020)      COPD exacerbation ([facility] [address] (6/16/2020)        Plan:  1. COPD exacerbation- IV antibiotics, IV steroids, bronchodilator therapy. 2. Suspected COVID-19 infection- swab sent, result pending. 3. History of hypertension- resume home medications, monitor blood pressure  4. History of BPH- continue Avodart and Flomax  5. History of gout-resume allopurinol  6. History of chronic respiratory failure-on 2 L nasal cannula at home  7. ANNALISA- gentle hydration, monitor creatinine  DVT prophylaxis-Heparin  Full code              HPI:     González Bella is a 80 y.o. male who presents to the emergency room secondary to shortness of breath and tachypnea. No fever, per ER report no recent travel or exposure to patients with COVID-19 infection. Patient is a poor historian and is unable to give a significant amount of information, most of the information is obtained from the ER note. Past medical history-BPH, gout, COPD, chronic respiratory failure on 2 L nasal cannula, hypertension, hyperlipidemia. ER evaluation- labs reviewed, noted to have mild ANNALISA, CTA chest shows no evidence of pulmonary metastatic dissection, 7 mm groundglass pulmonary nodule, COPD changes, large hiatal hernia and ectasia of the ascending and descending thoracic aorta. Initial plan was to discharge patient home however patient continued to be tachypneic and short of breath and his daughters were consulted by ER PA and they recommended that he be admitted for further evaluation.     Past Medical History:   Diagnosis Date    Arthropathy, unspecified, other specified sites     Bone pain     BPH (benign prostatic hypertrophy)     BPH with obstruction/lower urinary tract symptoms     Cough     Fracture     right distal humerus     Gout     H/O seasonal allergies     Hypercholesterolemia     Hypertension     Obesity     Pain with swallowing     Retention of urine, unspecified     Right arm pain     SOB (shortness of breath)     Tattoo     Urinary retention        Past Surgical History:   Procedure Laterality Date    COLONOSCOPY N/A 12/2/2016    COLONOSCOPY performed by Raul Terry MD at [address] HX ORTHOPAEDIC  12-    ORIF, right distal humerus fracture    HX OTHER SURGICAL      CYSTOSCOPY W PHOTOVAPORIZATION POST PROCEDURE ORDERSET 1    MS COLSC FLX W/NDSC US XM RCTM ET AL LMTD&ADJ STRUX         No family history on file. Social History     Socioeconomic History    Marital status:      Spouse name: Not on file    Number of children: Not on file    Years of education: Not on file    Highest education level: Not on file   Tobacco Use    Smoking status: Former Smoker    Smokeless tobacco: Former User   Substance and Sexual Activity    Alcohol use: Yes     Comment: occasionally    Drug use: No       Prior to Admission medications    Medication Sig Start Date End Date Taking? Authorizing Provider   tamsulosin (FLOMAX) 0.4 mg capsule Take 1 Cap by mouth daily. 4/8/19   Norma Tran MD   dutasteride (AVODART) 0.5 mg capsule Take 1 Cap by mouth daily. 4/8/19   Mary Phan MD   metoprolol tartrate (LOPRESSOR) 25 mg tablet Take 25 mg by mouth two (2) times a day. Provider, Historical   metFORMIN (GLUCOPHAGE) 500 mg tablet Take  by mouth two (2) times daily (with meals). Provider, Historical   allopurinol (ZYLOPRIM) 100 mg tablet Take 100 mg by mouth daily. Provider, Historical   folic acid (FOLVITE) 1 mg tablet Take  by mouth daily. Provider, Historical   docusate sodium (COLACE) 100 mg capsule Take 100 mg by mouth two (2) times a day.       Provider, Historical amLODIPine (NORVASC) 5 mg tablet Take 5 mg by mouth daily. Provider, Historical   gabapentin (NEURONTIN) 100 mg capsule Take  by mouth three (3) times daily. Provider, Historical   simvastatin (ZOCOR) 20 mg tablet Take  by mouth nightly. Provider, Historical   valsartan-hydrochlorothiazide (DIOVAN HCT) 160-12.5 mg per tablet Take 1 Tab by mouth daily. Provider, Historical   fish oil-dha-epa (FISH OIL) 1,200-144-216 mg Cap Take  by mouth. Provider, Historical   Magnesium Oxide 500 mg Cap Take  by mouth. Provider, Historical   risperidone (RISPERDAL) 3 mg tablet Take  by mouth. Provider, Historical   hydrocodone-acetaminophen (VICODIN ES) 7.5-750 mg per tablet Take  by mouth every six (6) hours as needed. Provider, Historical       No Known Allergies    Review of Systems  Review of systems not obtained due to patient factors. Physical Exam:      Visit Vitals  BP (!) 162/97   Pulse 65   Temp 98.6 °F (37 °C)   Resp (!) 33   Ht 5' 9\" (1.753 m)   Wt 89.8 kg (198 lb)   SpO2 100%   BMI 29.24 kg/m²       Physical Exam:    Gen: In general, this is a poorly nourished male in no acute distress , disheveled   HEENT: Sclerae nonicteric. Oral mucous membranes moist. Dentition poor  Neck: Supple with midline trachea. CV: RRR without murmur or rub appreciated. Resp:Respirations are unlabored without use of accessory muscles. Lung fields B/L without wheezes or rhonchi. Abd: Soft, nontender, nondistended. Extrem: Extremities are warm, without cyanosis or clubbing. No pitting pretibial edema. Skin: Warm, no visible rashes. Neuro: Patient is alert, oriented, and cooperative. No obvious focal defects. Moves all 4 extremities.     Labs Reviewed:    Recent Results (from the past 24 hour(s))   CBC WITH AUTOMATED DIFF    Collection Time: 06/16/20  4:05 PM   Result Value Ref Range    WBC 11.6 4.6 - 13.2 K/uL    RBC 4.70 4.70 - 5.50 M/uL    HGB 12.8 (L) 13.0 - 16.0 g/dL    HCT 37.5 36.0 - 48.0 %    MCV 79.8 74.0 - 97.0 FL    MCH 27.2 24.0 - 34.0 PG    MCHC 34.1 31.0 - 37.0 g/dL    RDW 14.2 11.6 - 14.5 %    PLATELET 951 121 - 962 K/uL    MPV 11.2 9.2 - 11.8 FL    NEUTROPHILS 70 40 - 73 %    LYMPHOCYTES 23 21 - 52 %    MONOCYTES 5 3 - 10 %    EOSINOPHILS 2 0 - 5 %    BASOPHILS 0 0 - 2 %    ABS. NEUTROPHILS 8.1 (H) 1.8 - 8.0 K/UL    ABS. LYMPHOCYTES 2.7 0.9 - 3.6 K/UL    ABS. MONOCYTES 0.6 0.05 - 1.2 K/UL    ABS. EOSINOPHILS 0.2 0.0 - 0.4 K/UL    ABS. BASOPHILS 0.0 0.0 - 0.1 K/UL    DF AUTOMATED     METABOLIC PANEL, COMPREHENSIVE    Collection Time: 06/16/20  4:05 PM   Result Value Ref Range    Sodium 138 136 - 145 mmol/L    Potassium 3.6 3.5 - 5.5 mmol/L    Chloride 107 100 - 111 mmol/L    CO2 19 (L) 21 - 32 mmol/L    Anion gap 12 3.0 - 18 mmol/L    Glucose 196 (H) 74 - 99 mg/dL    BUN 24 (H) 7.0 - 18 MG/DL    Creatinine 1.55 (H) 0.6 - 1.3 MG/DL    BUN/Creatinine ratio 15 12 - 20      GFR est AA 52 (L) >60 ml/min/1.73m2    GFR est non-AA 43 (L) >60 ml/min/1.73m2    Calcium 10.1 8.5 - 10.1 MG/DL    Bilirubin, total 0.6 0.2 - 1.0 MG/DL    ALT (SGPT) 25 16 - 61 U/L    AST (SGOT) 24 10 - 38 U/L    Alk.  phosphatase 98 45 - 117 U/L    Protein, total 8.8 (H) 6.4 - 8.2 g/dL    Albumin 4.0 3.4 - 5.0 g/dL    Globulin 4.8 (H) 2.0 - 4.0 g/dL    A-G Ratio 0.8 0.8 - 1.7     CARDIAC PANEL,(CK, CKMB & TROPONIN)    Collection Time: 06/16/20  4:05 PM   Result Value Ref Range    CK - MB 2.8 <3.6 ng/ml    CK-MB Index 2.2 0.0 - 4.0 %     39 - 308 U/L    Troponin-I, QT <0.02 0.0 - 0.045 NG/ML   NT-PRO BNP    Collection Time: 06/16/20  4:05 PM   Result Value Ref Range    NT pro- 0 - 1,800 PG/ML   D DIMER    Collection Time: 06/16/20  4:05 PM   Result Value Ref Range    D DIMER 0.84 (H) <0.46 ug/ml(FEU)   PROCALCITONIN    Collection Time: 06/16/20  4:05 PM   Result Value Ref Range    Procalcitonin <0.05 ng/mL   MAGNESIUM    Collection Time: 06/16/20  4:05 PM   Result Value Ref Range    Magnesium 1.9 1.6 - 2.6 mg/dL   FIBRINOGEN    Collection Time: 06/16/20  4:05 PM   Result Value Ref Range    Fibrinogen 404 210 - 451 mg/dL   C REACTIVE PROTEIN, QT    Collection Time: 06/16/20  4:05 PM   Result Value Ref Range    C-Reactive protein <0.3 0 - 0.3 mg/dL   LD    Collection Time: 06/16/20  4:05 PM   Result Value Ref Range     (H) 81 - 234 U/L   FERRITIN    Collection Time: 06/16/20  4:05 PM   Result Value Ref Range    Ferritin 95 8 - 388 NG/ML   EKG, 12 LEAD, INITIAL    Collection Time: 06/16/20  5:02 PM   Result Value Ref Range    Ventricular Rate 74 BPM    Atrial Rate 74 BPM    P-R Interval 142 ms    QRS Duration 90 ms    Q-T Interval 404 ms    QTC Calculation (Bezet) 448 ms    Calculated P Axis -25 degrees    Calculated R Axis -6 degrees    Calculated T Axis 55 degrees    Diagnosis       Normal sinus rhythm  Nonspecific ST and T wave abnormality  Abnormal ECG  When compared with ECG of 14-FEB-2019 13:33,  Nonspecific T wave abnormality now evident in Inferior leads  Confirmed by Sabina Garcia ([id number]) on 6/16/2020 10:10:04 PM     CARDIAC PANEL,(CK, CKMB & TROPONIN)    Collection Time: 06/16/20  7:12 PM   Result Value Ref Range    CK - MB 2.3 <3.6 ng/ml    CK-MB Index 2.5 0.0 - 4.0 %    CK 91 39 - 308 U/L    Troponin-I, QT <0.02 0.0 - 0.045 NG/ML       Imaging Reviewed:    CTA chest  IMPRESSION:  1. No evidence of pulmonary metastatic dissection.     2.  7 mm groundglass pulmonary nodule with additional right upper lobe pulmonary  nodules. Consider short-term follow-up to document stability in 3 months.     3. COPD changes.     4.  Large hiatal hernia.     5.  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           Conor Soriano MD  6/16/2020, 11:15 PM        Disclaimer: Sections of this note are dictated using utilizing voice recognition software. Minor typographical errors may be present. If questions arise, please do not hesitate to contact me or call our department.

## 2020-06-17 NOTE — PROGRESS NOTES
9226: Pt arrived floor via stretcher from ED, alert and oriented, assessment and V/S completed, pt resting in bed. Pt denies pain or discomfort. Will continue to monitor.

## 2020-06-17 NOTE — PROGRESS NOTES
Reason for Admission:  Suspected COVID-19 virus infection [Z20.828]  Tachypnea [R06.82]  Dyspnea [R06.00]  COPD exacerbation ([facility] [address] [J44.1]                 RRAT Score:    17            Plan for utilizing home health: If needed                      Likelihood of Readmission:   LOW                         Transition of Care Plan:              Initial assessment completed with relative(s). Cognitive status of patient: oriented to time, place, person and situation. DAUGHTER STATES PATIENT CANNOT SEE OUT OF LEFT EYE    Face sheet information confirmed:  yes. The patient designates Didi Tovar [phone] to participate in his discharge plan and to receive any needed information. This patient lives in a apartment with patient. Patient is not able to navigate steps as needed. Prior to hospitalization, patient was considered to be independent with ADLs/IADLS : no . If not independent,  patient needs assist with : dressing, bathing, food preparation, cooking and toileting    Patient has a current ACP document on file: no  The patient and other:  [facility]  will be available to transport patient home upon discharge. The patient already has none reported, and OXYGEN 2L NC FROM [facility] medical equipment available in the home. Patient is not currently active with home health. Patient has not stayed in a skilled nursing facility or rehab. Was  stay within last 60 days : no. This patient is on dialysis :no    List of available SNF agencies were provided and reviewed with the patient prior to discharge. Freedom of choice signed: yes, for [address]. Currently, the discharge plan is SNF. FREEDOM OF CHOICE ALSO VERBALLY SIGNED [facility]. The patient states that he can obtain his medications from the pharmacy, and take his medications as directed.     Patient's current insurance is [employer] AND MEDICAID       Care Management Interventions  PCP Verified by CM:  Yes  Mode of Transport at Discharge: BLS  Current Support Network: Lives Alone  Confirm Follow Up Transport: Family  The Plan for Transition of Care is Related to the Following Treatment Goals : Skilled nursing facility  The Patient and/or Patient Representative was Provided with a Choice of Provider and Agrees with the Discharge Plan?: Yes  Name of the Patient Representative Who was Provided with a Choice of Provider and Agrees with the Discharge Plan: Ronda Leyva, daughter  Freedom of Choice List was Provided with Basic Dialogue that Supports the Patient's Individualized Plan of Care/Goals, Treatment Preferences and Shares the Quality Data Associated with the Providers?: Yes  Discharge Location  Discharge Placement: Skilled nursing facility        Larry Rodriguez, RN BSN  Care Manager  [phone]

## 2020-06-18 LAB — FIBRINOGEN PPP-MCNC: 298 MG/DL (ref 210–451)

## 2020-06-18 PROCEDURE — 85384 FIBRINOGEN ACTIVITY: CPT

## 2020-06-18 PROCEDURE — 74011250637 HC RX REV CODE- 250/637: Performed by: HOSPITALIST

## 2020-06-18 PROCEDURE — 97165 OT EVAL LOW COMPLEX 30 MIN: CPT

## 2020-06-18 PROCEDURE — 36415 COLL VENOUS BLD VENIPUNCTURE: CPT

## 2020-06-18 PROCEDURE — 65270000029 HC RM PRIVATE

## 2020-06-18 PROCEDURE — 74011250636 HC RX REV CODE- 250/636: Performed by: HOSPITALIST

## 2020-06-18 PROCEDURE — 77010033678 HC OXYGEN DAILY

## 2020-06-18 RX ORDER — LEVOFLOXACIN 750 MG/1
750 TABLET ORAL
Status: DISCONTINUED | OUTPATIENT
Start: 2020-06-19 | End: 2020-06-19 | Stop reason: HOSPADM

## 2020-06-18 RX ADMIN — METHYLPREDNISOLONE SODIUM SUCCINATE 60 MG: 125 INJECTION, POWDER, FOR SOLUTION INTRAMUSCULAR; INTRAVENOUS at 11:45

## 2020-06-18 RX ADMIN — HEPARIN SODIUM 5000 UNITS: 5000 INJECTION INTRAVENOUS; SUBCUTANEOUS at 20:12

## 2020-06-18 RX ADMIN — RISPERIDONE 3 MG: 2 TABLET ORAL at 08:39

## 2020-06-18 RX ADMIN — GABAPENTIN 100 MG: 100 CAPSULE ORAL at 14:53

## 2020-06-18 RX ADMIN — OMEGA-3 FATTY ACIDS CAP 1000 MG 1 CAPSULE: 1000 CAP at 08:39

## 2020-06-18 RX ADMIN — HYDROCHLOROTHIAZIDE 12.5 MG: 25 TABLET ORAL at 08:37

## 2020-06-18 RX ADMIN — DOCUSATE SODIUM 100 MG: 100 CAPSULE, LIQUID FILLED ORAL at 20:10

## 2020-06-18 RX ADMIN — GABAPENTIN 100 MG: 100 CAPSULE ORAL at 08:36

## 2020-06-18 RX ADMIN — GABAPENTIN 100 MG: 100 CAPSULE ORAL at 20:10

## 2020-06-18 RX ADMIN — METOPROLOL TARTRATE 25 MG: 25 TABLET, FILM COATED ORAL at 20:10

## 2020-06-18 RX ADMIN — FOLIC ACID 1 MG: 1 TABLET ORAL at 08:39

## 2020-06-18 RX ADMIN — DOCUSATE SODIUM 100 MG: 100 CAPSULE, LIQUID FILLED ORAL at 08:39

## 2020-06-18 RX ADMIN — METHYLPREDNISOLONE SODIUM SUCCINATE 60 MG: 125 INJECTION, POWDER, FOR SOLUTION INTRAMUSCULAR; INTRAVENOUS at 17:01

## 2020-06-18 RX ADMIN — HEPARIN SODIUM 5000 UNITS: 5000 INJECTION INTRAVENOUS; SUBCUTANEOUS at 08:57

## 2020-06-18 RX ADMIN — Medication 10 ML: at 22:31

## 2020-06-18 RX ADMIN — VALSARTAN 160 MG: 160 TABLET, FILM COATED ORAL at 08:37

## 2020-06-18 RX ADMIN — AMLODIPINE BESYLATE 5 MG: 5 TABLET ORAL at 08:40

## 2020-06-18 RX ADMIN — DUTASTERIDE 0.5 MG: 0.5 CAPSULE, LIQUID FILLED ORAL at 08:55

## 2020-06-18 RX ADMIN — TAMSULOSIN HYDROCHLORIDE 0.4 MG: 0.4 CAPSULE ORAL at 08:40

## 2020-06-18 RX ADMIN — Medication 10 ML: at 05:58

## 2020-06-18 RX ADMIN — METHYLPREDNISOLONE SODIUM SUCCINATE 60 MG: 125 INJECTION, POWDER, FOR SOLUTION INTRAMUSCULAR; INTRAVENOUS at 05:57

## 2020-06-18 RX ADMIN — METHYLPREDNISOLONE SODIUM SUCCINATE 60 MG: 125 INJECTION, POWDER, FOR SOLUTION INTRAMUSCULAR; INTRAVENOUS at 22:30

## 2020-06-18 RX ADMIN — ATORVASTATIN CALCIUM 10 MG: 20 TABLET, FILM COATED ORAL at 08:36

## 2020-06-18 RX ADMIN — METOPROLOL TARTRATE 25 MG: 25 TABLET, FILM COATED ORAL at 08:39

## 2020-06-18 RX ADMIN — Medication 10 ML: at 14:55

## 2020-06-18 RX ADMIN — HEPARIN SODIUM 5000 UNITS: 5000 INJECTION INTRAVENOUS; SUBCUTANEOUS at 00:26

## 2020-06-18 RX ADMIN — ALLOPURINOL 100 MG: 100 TABLET ORAL at 08:39

## 2020-06-18 NOTE — ROUTINE PROCESS
Bedside shift change report given to Vinny Tejada RN (oncoming nurse) by Erum Ellison RN (offgoing nurse). Report included the following information SBAR, Kardex, Intake/Output and MAR.

## 2020-06-18 NOTE — ROUTINE PROCESS
0730- Received report from off going RN  1145-  Sitting in bed eating lunch. Denies needs. 1327- Report called to Rayray Schuster [address]

## 2020-06-18 NOTE — PROGRESS NOTES
Problem: Self Care Deficits Care Plan (Adult)  Goal: *Acute Goals and Plan of Care (Insert Text)  Description: Occupational Therapy Goals  Initiated 6/18/2020 within 7 day(s). 1.  Patient will perform grooming tasks while standing with modified independence and fair+ standing balance. 2.  Patient will perform lower body dressing with independence. 3.  Patient will perform functional task in standing for 8 minutes with modified independence. 4.  Patient will perform toilet transfers with modified independence. 5.  Patient will perform all aspects of toileting with modified independence. 6.  Patient will participate in upper extremity therapeutic exercise/activities with supervision for 8 minutes to maximize BUE strength for functional transfers & ADLs. PLOF: Pt was independent with functional mobility & ADLs. Outcome: Progressing Towards Goal     OCCUPATIONAL THERAPY EVALUATION    Patient: Randy Montague (13 y.o. male)  Date: 6/18/2020  Primary Diagnosis: Suspected COVID-19 virus infection [Z20.828]  Tachypnea [R06.82]  Dyspnea [R06.00]  COPD exacerbation ([facility] [address] [J44.1]        Precautions:   Fall  PLOF: Pt reports independence with ADLs & functional mobility. ASSESSMENT :  Based on the objective data described below, the patient presents with decreased independence in bed mobility, transfers & ADLs secondary to COPD exacerbation. Pt supine on arrival, no c/o pain pre/post session. Supervision for bed mobility, intact sitting balance during management of bilateral socks with close supervision for safety. Pt refusing to utilize RW despite max verbal encouragement. Hand held assist for sit to stands, side stepping & 10 marches in prep for shower transfer; 2 minimal LOB, however pt able to recover with CGA/min A for balance. Educated pt on use of AD to increase stability, pt continues to refuse to utilize RW. Pt returned supine with supervision at end of session, all needs met, no c/o pain, bed alarm on. Recommend HH upon d/c. Will plan to see pt 1-2 more visits to address all goals. RN aware of session. Patient will benefit from skilled intervention to address the above impairments. Patient's rehabilitation potential is considered to be Good  Factors which may influence rehabilitation potential include:   []             None noted  []             Mental ability/status  [x]             Medical condition  []             Home/family situation and support systems  []             Safety awareness  []             Pain tolerance/management  []             Other:      PLAN :  Recommendations and Planned Interventions:   [x]               Self Care Training                  [x]      Therapeutic Activities  [x]               Functional Mobility Training   []      Cognitive Retraining  [x]               Therapeutic Exercises           [x]      Endurance Activities  [x]               Balance Training                    []      Neuromuscular Re-Education  []               Visual/Perceptual Training     [x]      Home Safety Training  [x]               Patient Education                   [x]      Family Training/Education  []               Other (comment):    Frequency/Duration: Patient will be followed by occupational therapy 1-2 more visits to address goals. Discharge Recommendations: Home Health  Further Equipment Recommendations for Discharge: shower chair     SUBJECTIVE:   Patient stated I might go home tomorrow.     OBJECTIVE DATA SUMMARY:     Past Medical History:   Diagnosis Date    Arthropathy, unspecified, other specified sites     Bone pain     BPH (benign prostatic hypertrophy)     BPH with obstruction/lower urinary tract symptoms     Cough     Fracture     right distal humerus     Gout     H/O seasonal allergies     Hypercholesterolemia     Hypertension     Obesity     Pain with swallowing     Retention of urine, unspecified     Right arm pain     SOB (shortness of breath)     Tattoo     Urinary retention Past Surgical History:   Procedure Laterality Date    COLONOSCOPY N/A 12/2/2016    COLONOSCOPY performed by Mariam Aviles MD at [facility]    HX ORTHOPAEDIC  12-    ORIF, right distal humerus fracture    HX OTHER SURGICAL      CYSTOSCOPY W PHOTOVAPORIZATION POST PROCEDURE ORDERSET 636    SC COLSC FLX W/NDSC US XM RCTM ET AL LMTD&ADJ STRUX       Barriers to Learning/Limitations: None  Compensate with: visual, verbal, tactile, kinesthetic cues/model    Home Situation:   Home Situation  Home Environment: Apartment  # Steps to Enter: 1  One/Two Story Residence: One story  Living Alone: No  Support Systems: Child(olga), Friends \ neighbors  Patient Expects to be Discharged to[de-identified] Apartment  Current DME Used/Available at Home: Grab bars  Tub or Shower Type: Shower  [x]  Right hand dominant   []  Left hand dominant    Cognitive/Behavioral Status:  Neurologic State: Alert  Orientation Level: Oriented X4  Cognition: Appropriate decision making; Appropriate for age attention/concentration; Appropriate safety awareness; Follows commands  Safety/Judgement: Awareness of environment; Fall prevention    Skin: Intact  Edema: None noted    Vision/Perceptual:    Acuity: Within Defined Limits      Coordination: BUE  Coordination: Within functional limits  Fine Motor Skills-Upper: Right Intact; Left Intact    Gross Motor Skills-Upper: Right Intact; Left Intact    Balance:  Sitting: Intact  Standing: Impaired  Standing - Static: Fair  Standing - Dynamic : Fair    Strength: BUE  Strength:  Within functional limits    Tone & Sensation: BUE  Sensation: Intact      Range of Motion: BUE  AROM: Within functional limits    Functional Mobility and Transfers for ADLs:  Bed Mobility:  Supine to Sit: Supervision  Sit to Supine: Supervision    Transfers:  Sit to Stand: Minimum assistance  Stand to Sit: Minimum assistance    ADL Assessment:   Feeding: Setup  Oral Facial Hygiene/Grooming: Setup  Bathing: Supervision  Upper Body Dressing: Supervision  Lower Body Dressing: Minimum assistance  Toileting: Minimum assistance    Cognitive Retraining  Safety/Judgement: Awareness of environment; Fall prevention    Pain:  Pain level pre-treatment: 0/10   Pain level post-treatment: 0/10       Activity Tolerance: Fair  Please refer to the flowsheet for vital signs taken during this treatment. After treatment:   [] Patient left in no apparent distress sitting up in chair  [x] Patient left in no apparent distress in bed  [x] Call bell left within reach  [x] Nursing notified  [] Caregiver present  [x] Bed alarm activated    COMMUNICATION/EDUCATION:   [x] Role of Occupational Therapy in the acute care setting  [x] Home safety education was provided and the patient/caregiver indicated understanding. [x] Patient/family have participated as able in goal setting and plan of care. [] Patient/family agree to work toward stated goals and plan of care. [] Patient understands intent and goals of therapy, but is neutral about his/her participation. [] Patient is unable to participate in goal setting and plan of care. Thank you for this referral.  Amanda Parish MS OTR/L  Time Calculation: 10 mins    Eval Complexity: History: LOW Complexity : Brief history review ; Examination: LOW Complexity : 1-3 performance deficits relating to physical, cognitive , or psychosocial skils that result in activity limitations and / or participation restrictions ;    Decision Making:LOW Complexity : No comorbidities that affect functional and no verbal or physical assistance needed to complete eval tasks

## 2020-06-18 NOTE — PROGRESS NOTES
[facility] Hospitalist Group  Progress Note    Patient: James Chavez Age: 80 y.o. : 1935 MR#: [id number] SSN: [id number]  Date: 2020    Subjective/24-hour events:     Patient feeling significantly better overall with regard to shortness of breath. No acute issues overnight, denies chest pain. Remains afebrile. Assessment:   COPD with acute exacerbation   Chronic hypoxic respiratory failure on home oxygen  Acute renal insufficiency, improved  Hypertension  Dyslipidemia  Gout  BPH  Advanced age  [phone] negative    Plan:  Continue antibiotic therapy but will transition to PO levaquin - d/c IV rocephin and azithromycin. Solumedrol through today, transition to prednisone tomorrow if continues to improve. Antihypertensive therapy and other home meds as previously prescribed. PT/OT evals. Patient reports being ambulatory without assistive devices and has assistance of family at home. Novel coronavirus testing negative. Transfer patient to medical bed. Supportive care otherwise. Follow. Case discussed with:  [x]Patient  []Family  [x]Nursing  [x]Case Management  DVT Prophylaxis:  []Lovenox  [x]Hep SQ  []SCDs  []Coumadin   []On Heparin gtt    Objective:   VS:   Visit Vitals  /80 (BP 1 Location: Right arm, BP Patient Position: At rest)   Pulse 66   Temp (!) 96.5 °F (35.8 °C) Comment: notified [facility], RN   Resp 18   Ht 5' 9\" (1.753 m)   Wt 89.4 kg (197 lb)   SpO2 100%   BMI 29.09 kg/m²      Tmax/24hrs: Temp (24hrs), Av.4 °F (36.3 °C), Min:96.5 °F (35.8 °C), Max:98.7 °F (37.1 °C)      Intake/Output Summary (Last 24 hours) at 2020 0951  Last data filed at 2020 0602  Gross per 24 hour   Intake 540 ml   Output 450 ml   Net 90 ml       General:  In NAD. Cardiovascular:  RRR. Pulmonary:  Decreased BS throughout, no accessory muscle use. GI:  Abdomen soft, NTTP. Extremities:  Warm, no ischemia. Neuro:   Moves extremities pontaneously, motor nonfocal.    Labs: Recent Results (from the past 24 hour(s))   FIBRINOGEN    Collection Time: 06/18/20  2:30 AM   Result Value Ref Range    Fibrinogen 298 210 - 451 mg/dL       Signed By: Hosea Eric MD     June 18, 2020

## 2020-06-18 NOTE — PROGRESS NOTES
Problem: Falls - Risk of  Goal: *Absence of Falls  Description: Document Earma Mesha Fall Risk and appropriate interventions in the flowsheet. Outcome: Progressing Towards Goal  Note: Fall Risk Interventions:  Mobility Interventions: Patient to call before getting OOB         Medication Interventions: Patient to call before getting OOB    Elimination Interventions: Call light in reach, Patient to call for help with toileting needs              Problem: Patient Education: Go to Patient Education Activity  Goal: Patient/Family Education  Outcome: Progressing Towards Goal     Problem: Pressure Injury - Risk of  Goal: *Prevention of pressure injury  Description: Document Brendan Scale and appropriate interventions in the flowsheet.   Outcome: Progressing Towards Goal  Note: Pressure Injury Interventions:  Sensory Interventions: Assess changes in LOC    Moisture Interventions: Absorbent underpads    Activity Interventions: Increase time out of bed    Mobility Interventions: HOB 30 degrees or less    Nutrition Interventions: Document food/fluid/supplement intake    Friction and Shear Interventions: HOB 30 degrees or less                Problem: Patient Education: Go to Patient Education Activity  Goal: Patient/Family Education  Outcome: Progressing Towards Goal     Problem: Patient Education: Go to Patient Education Activity  Goal: Patient/Family Education  Outcome: Progressing Towards Goal     Problem: Airway Clearance - Ineffective  Goal: Achieve or maintain patent airway  Outcome: Progressing Towards Goal     Problem: Gas Exchange - Impaired  Goal: Absence of hypoxia  Outcome: Progressing Towards Goal  Goal: Promote optimal lung function  Outcome: Progressing Towards Goal     Problem: Breathing Pattern - Ineffective  Goal: Ability to achieve and maintain a regular respiratory rate  Outcome: Progressing Towards Goal     Problem: Isolation Precautions - Risk of Spread of Infection  Goal: Prevent transmission of infectious organism to others  Outcome: Progressing Towards Goal     Problem: Risk for Fluid Volume Deficit  Goal: Maintain normal heart rhythm  Outcome: Progressing Towards Goal  Goal: Maintain absence of muscle cramping  Outcome: Progressing Towards Goal  Goal: Maintain normal serum potassium, sodium, calcium, phosphorus, and pH  Outcome: Progressing Towards Goal     Problem: Loneliness or Risk for Loneliness  Goal: Demonstrate positive use of time alone when socialization is not possible  Outcome: Progressing Towards Goal     Problem: Patient Education: Go to Patient Education Activity  Goal: Patient/Family Education  Outcome: Progressing Towards Goal

## 2020-06-19 VITALS
SYSTOLIC BLOOD PRESSURE: 132 MMHG | OXYGEN SATURATION: 100 % | WEIGHT: 197 LBS | BODY MASS INDEX: 29.18 KG/M2 | TEMPERATURE: 97.5 F | RESPIRATION RATE: 18 BRPM | DIASTOLIC BLOOD PRESSURE: 73 MMHG | HEART RATE: 60 BPM | HEIGHT: 69 IN

## 2020-06-19 LAB — FIBRINOGEN PPP-MCNC: 291 MG/DL (ref 210–451)

## 2020-06-19 PROCEDURE — 74011250637 HC RX REV CODE- 250/637: Performed by: HOSPITALIST

## 2020-06-19 PROCEDURE — 74011636637 HC RX REV CODE- 636/637: Performed by: FAMILY MEDICINE

## 2020-06-19 PROCEDURE — 77010033678 HC OXYGEN DAILY

## 2020-06-19 PROCEDURE — 74011250636 HC RX REV CODE- 250/636: Performed by: HOSPITALIST

## 2020-06-19 PROCEDURE — 97530 THERAPEUTIC ACTIVITIES: CPT

## 2020-06-19 PROCEDURE — 36415 COLL VENOUS BLD VENIPUNCTURE: CPT

## 2020-06-19 PROCEDURE — 97162 PT EVAL MOD COMPLEX 30 MIN: CPT

## 2020-06-19 PROCEDURE — 85384 FIBRINOGEN ACTIVITY: CPT

## 2020-06-19 PROCEDURE — 74011250637 HC RX REV CODE- 250/637: Performed by: FAMILY MEDICINE

## 2020-06-19 RX ORDER — PREDNISONE 10 MG/1
TABLET ORAL
Qty: 25 TAB | Refills: 0 | Status: SHIPPED | OUTPATIENT
Start: 2020-06-19

## 2020-06-19 RX ORDER — PREDNISONE 20 MG/1
40 TABLET ORAL DAILY
Status: DISCONTINUED | OUTPATIENT
Start: 2020-06-19 | End: 2020-06-19 | Stop reason: HOSPADM

## 2020-06-19 RX ORDER — LEVOFLOXACIN 750 MG/1
750 TABLET ORAL
Qty: 1 TAB | Refills: 0 | Status: SHIPPED | OUTPATIENT
Start: 2020-06-20

## 2020-06-19 RX ORDER — IPRATROPIUM BROMIDE AND ALBUTEROL SULFATE 2.5; .5 MG/3ML; MG/3ML
3 SOLUTION RESPIRATORY (INHALATION)
Qty: 60 NEBULE | Refills: 1 | Status: SHIPPED | OUTPATIENT
Start: 2020-06-19

## 2020-06-19 RX ADMIN — Medication 10 ML: at 14:00

## 2020-06-19 RX ADMIN — GABAPENTIN 100 MG: 100 CAPSULE ORAL at 08:12

## 2020-06-19 RX ADMIN — VALSARTAN 160 MG: 160 TABLET, FILM COATED ORAL at 08:10

## 2020-06-19 RX ADMIN — HYDROCHLOROTHIAZIDE 12.5 MG: 25 TABLET ORAL at 08:11

## 2020-06-19 RX ADMIN — GABAPENTIN 100 MG: 100 CAPSULE ORAL at 14:11

## 2020-06-19 RX ADMIN — LEVOFLOXACIN 750 MG: 750 TABLET, FILM COATED ORAL at 06:46

## 2020-06-19 RX ADMIN — AMLODIPINE BESYLATE 5 MG: 5 TABLET ORAL at 08:10

## 2020-06-19 RX ADMIN — METOPROLOL TARTRATE 25 MG: 25 TABLET, FILM COATED ORAL at 08:10

## 2020-06-19 RX ADMIN — ALLOPURINOL 100 MG: 100 TABLET ORAL at 08:10

## 2020-06-19 RX ADMIN — Medication 10 ML: at 05:23

## 2020-06-19 RX ADMIN — HEPARIN SODIUM 5000 UNITS: 5000 INJECTION INTRAVENOUS; SUBCUTANEOUS at 05:17

## 2020-06-19 RX ADMIN — HEPARIN SODIUM 5000 UNITS: 5000 INJECTION INTRAVENOUS; SUBCUTANEOUS at 13:22

## 2020-06-19 RX ADMIN — METHYLPREDNISOLONE SODIUM SUCCINATE 60 MG: 125 INJECTION, POWDER, FOR SOLUTION INTRAMUSCULAR; INTRAVENOUS at 05:15

## 2020-06-19 RX ADMIN — OMEGA-3 FATTY ACIDS CAP 1000 MG 1 CAPSULE: 1000 CAP at 08:12

## 2020-06-19 RX ADMIN — DOCUSATE SODIUM 100 MG: 100 CAPSULE, LIQUID FILLED ORAL at 08:12

## 2020-06-19 RX ADMIN — FOLIC ACID 1 MG: 1 TABLET ORAL at 08:11

## 2020-06-19 RX ADMIN — PREDNISONE 40 MG: 20 TABLET ORAL at 11:12

## 2020-06-19 RX ADMIN — TAMSULOSIN HYDROCHLORIDE 0.4 MG: 0.4 CAPSULE ORAL at 08:12

## 2020-06-19 RX ADMIN — DUTASTERIDE 0.5 MG: 0.5 CAPSULE, LIQUID FILLED ORAL at 09:00

## 2020-06-19 RX ADMIN — RISPERIDONE 3 MG: 2 TABLET ORAL at 08:12

## 2020-06-19 RX ADMIN — ATORVASTATIN CALCIUM 10 MG: 20 TABLET, FILM COATED ORAL at 08:13

## 2020-06-19 NOTE — ROUTINE PROCESS
Called daughter, Shandra Bergman, to arrange discharge plans for the patient. No answer, message was left. 1232: Regina returned my phone call and I informed her that we are ready to discharge her father when she could have someone here to pick him up. When I informed her that whoever picks him up must have his home O2 with them she stated, \"We cannot get his oxygen tank from home. \"  I informed her that we could not allow him to leave without oxygen due to his dependence to maintain his O2 sats. I told her we could arrange for medical transport. She asked me to wait on arranging medical transport because she was going to see if there was anyway she could get into his apartment and obtain his oxygen tank. She informed me she would get back to me as soon as possible. 1245:  Brought patient's house key to security with patient's permission per request of the daughter. She stated she will go  his oxygen tank and be back to pick her father up.    [phone]:  Called Regina to check on the time when someone would be available to pick him up. She stated that they are waiting for medical equipment and they will call when they are bout 10 minutes away. 1642:  Daughter, Tony Butcher, called and informed me that she has the O2 tanks needed for the patient to get safely home and asked me to put in transport for patient to be brought to the main entrance.

## 2020-06-19 NOTE — PROGRESS NOTES
Discharge order noted for today. Pt has been accepted to [facility] agency. Met with patient and discussed with daughter Sincere Valiente over the  phone and both are agreeable to the transition plan today. CM discussed discharge transportation with the pt's daughter over the phone, offered Medicaid transportation and she said she will just find somebody to come pick him up when ready. Patient's discharge summary and home health  orders have been forwarded to [address] [place] [address] via [place]. Updated bedside RN, Arturo Miranda,  to the transition plan.   Discharge information has been documented on the AVS.       [address]  [phone]

## 2020-06-19 NOTE — ROUTINE PROCESS
I have reviewed discharge instructions with the patient. The patient verbalized understanding. Discharge medications reviewed with patient and appropriate educational materials and side effects teaching were provided. Current Discharge Medication List      START taking these medications    Details   levoFLOXacin (LEVAQUIN) 750 mg tablet Take 1 Tab by mouth Daily (before breakfast). Qty: 1 Tab, Refills: 0      predniSONE (DELTASONE) 10 mg tablet Take 4 tabs PO daily x 3 days, then 3 tabs PO daily x 3 days, then 2 tabs PO daily x 2 days, then 1 tab PO x 1 day. Qty: 25 Tab, Refills: 0         CONTINUE these medications which have NOT CHANGED    Details   tamsulosin (FLOMAX) 0.4 mg capsule Take 1 Cap by mouth daily. Qty: 90 Cap, Refills: 3      dutasteride (AVODART) 0.5 mg capsule Take 1 Cap by mouth daily. Qty: 90 Cap, Refills: 3      metoprolol tartrate (LOPRESSOR) 25 mg tablet Take 25 mg by mouth two (2) times a day. folic acid (FOLVITE) 1 mg tablet Take  by mouth daily. docusate sodium (COLACE) 100 mg capsule Take 100 mg by mouth two (2) times a day. amLODIPine (NORVASC) 5 mg tablet Take 5 mg by mouth daily. gabapentin (NEURONTIN) 100 mg capsule Take  by mouth three (3) times daily. simvastatin (ZOCOR) 20 mg tablet Take  by mouth nightly. valsartan-hydrochlorothiazide (DIOVAN HCT) 160-12.5 mg per tablet Take 1 Tab by mouth daily. fish oil-dha-epa (FISH OIL) 1,200-144-216 mg Cap Take  by mouth. risperidone (RISPERDAL) 3 mg tablet Take  by mouth. Patient armband removed and shredded.

## 2020-06-19 NOTE — PROGRESS NOTES
Problem: Mobility Impaired (Adult and Pediatric)  Goal: *Acute Goals and Plan of Care (Insert Text)  Description: Physical Therapy Goals  Initiated 6/19/2020 and to be accomplished within 7 day(s)  1. Patient will move from supine to sit and sit to supine  in bed with modified independence. 2.  Patient will transfer from bed to chair and chair to bed with modified independence using the least restrictive device. 3.  Patient will perform sit to stand with modified independence. 4.  Patient will ambulate with modified independence for 150 feet with the least restrictive device. 5.  Patient will ascend/descend 1 stairs with least restrictive device with supervision/set-up. Prior Level of Function:   Patient was independence for all mobility including gait without AD. Patient has a cane at home but states he \"doesn't need it\". Patient lives daughter in a one-story apartment with one step to enter. Has cane and home O2. Baseline is 2 LPM O2. Outcome: Progressing Towards Goal   PHYSICAL THERAPY EVALUATION    Patient: Bert Ellington (74 y.o. male)  Date: 6/19/2020  Primary Diagnosis: Suspected COVID-19 virus infection [Z20.828]  Tachypnea [R06.82]  Dyspnea [R06.00]  COPD exacerbation ([facility] [[email or web address]]ca [facility].) [J44.1]        Precautions:   Fall  PLOF: see above    ASSESSMENT :  Based on the objective data described below, the patient presents with impaired standing static and dynamic balance, unsteady gait, and increased oxygen requirements secondary to COPD exacerbation. Patient received reclined in bed, awake and alert, agreeable to therapy, oriented x 4. SpO2 100% at rest on 3 LPM O2, HR 78 bpm at rest. Denied pain or shortness of breath pre/post session. Completed bed mobility with supervision and good efficiency and coordination. CGA for transfers and ambulation with LOB x 2 times with tendency have very narrow base of support, feet crossing midline at times, LOB during turns with CGA to min assist for recovery of balance. Ambulated x 20 feet in room with CGA with RW and without RW. Required verbal cues to keep walker on the floor. Patient repeatedly stated that he doesn't need a cane or a walker but PT educated patient on LOB during session and risk for falls. Patient denies falls at home. Patient educated on using cane a home as he doesn't keep the wheels of the walker on the floor. Patient stated again he doesn't need it. SpO2 % on 3 LPM throughout session. HR stable. Patient returned to reclined in bed after session, bed alarm on, call bell in reach, all needs in reach, in NAD. Recommend home with 24-hr supervision/support and home health; if 24-hr support is unavailable, recommend SNF. Patient will benefit from skilled intervention to address the above impairments. Patient's rehabilitation potential is considered to be Good  Factors which may influence rehabilitation potential include:   []         None noted  []         Mental ability/status  []         Medical condition  [x]         Home/family situation and support systems  [x]         Safety awareness  []         Pain tolerance/management  []         Other:      PLAN :  Recommendations and Planned Interventions:   []           Bed Mobility Training             [x]    Neuromuscular Re-Education  [x]           Transfer Training                   []    Orthotic/Prosthetic Training  [x]           Gait Training                          []    Modalities  [x]           Therapeutic Exercises           []    Edema Management/Control  [x]           Therapeutic Activities            [x]    Family Training/Education  [x]           Patient Education  []           Other (comment):    Frequency/Duration: Patient will be followed by physical therapy 1-2 times per day/4-7 days per week to address goals.   Discharge Recommendations: Home Health and 24-hr supervision; otherwise SNF  Further Equipment Recommendations for Discharge: straight cane and shower chair if not already owned SUBJECTIVE:   Patient stated I don't need a cane or a walker. I'm going home today.     OBJECTIVE DATA SUMMARY:     Past Medical History:   Diagnosis Date    Arthropathy, unspecified, other specified sites     Bone pain     BPH (benign prostatic hypertrophy)     BPH with obstruction/lower urinary tract symptoms     Cough     Fracture     right distal humerus     Gout     H/O seasonal allergies     Hypercholesterolemia     Hypertension     Obesity     Pain with swallowing     Retention of urine, unspecified     Right arm pain     SOB (shortness of breath)     Tattoo     Urinary retention      Past Surgical History:   Procedure Laterality Date    COLONOSCOPY N/A 12/2/2016    COLONOSCOPY performed by Rebeca Lucas MD at [facility]    HX ORTHOPAEDIC  12-    ORIF, right distal humerus fracture    HX OTHER SURGICAL      CYSTOSCOPY W PHOTOVAPORIZATION POST PROCEDURE ORDERSET 636    SD COLSC FLX W/NDSC US XM RCTM ET AL LMTD&ADJ STRUX       Barriers to Learning/Limitations: None  Compensate with: N/A  Home Situation:  Home Situation  Home Environment: Apartment  # Steps to Enter: 1  One/Two Story Residence: One story  Living Alone: No  Support Systems: Family member(s)(daughter)  Patient Expects to be Discharged to[de-identified] Apartment  Current DME Used/Available at Home: Cane, straight  Tub or Shower Type: Shower  Critical Behavior:  Neurologic State: Alert  Orientation Level: Oriented X4  Cognition: Follows commands  Safety/Judgement: Fall prevention  Psychosocial  Patient Behaviors: Calm; Cooperative         Strength:    Strength: Generally decreased, functional  Two attempts needed for sit to stand transfer from EOB.      Tone & Sensation:    Sensation: Intact     Range Of Motion:  AROM: Within functional limits     Functional Mobility:  Bed Mobility:   Supine to Sit: Supervision  Sit to Supine: Supervision  Scooting: Supervision    Transfers:  Sit to Stand: Contact guard assistance  Stand to Sit: Contact guard assistance        Balance:   Sitting: Intact; Without support  Standing: Impaired; Without support  Standing - Static: Fair;Occasional  Standing - Dynamic : Fair;Occasional        Ambulation/Gait Training:  Distance (ft): 40 Feet (ft)  Assistive Device: Other (comment); Walker, rolling(20 feet no AD)  Ambulation - Level of Assistance: Contact guard assistance   Gait Description (WDL): Exceptions to WDL  Gait Abnormalities: Decreased step clearance(LOB x 2 with turns)    Base of Support: Narrowed   Speed/Alysia: Slow;Pace decreased (<100 feet/min)  Step Length: Left shortened;Right shortened     Pain:  Pain level pre-treatment: 0/10   Pain level post-treatment: 0/10   Pain Intervention(s) : Medication (see MAR); Rest, Repositioning  Response to intervention: Nurse notified, See doc flow    Activity Tolerance:   Vitals stable on 3 LPM O2 throughout session  Please refer to the flowsheet for vital signs taken during this treatment. After treatment:   []         Patient left in no apparent distress sitting up in chair  [x]         Patient left in no apparent distress in bed  [x]         Call bell left within reach  [x]         Nursing notified  []         Caregiver present  [x]         Bed alarm activated  []         SCDs applied    COMMUNICATION/EDUCATION:   [x]         Role of Physical Therapy in the acute care setting. [x]         Fall prevention education was provided and the patient/caregiver indicated understanding. [x]         Patient/family have participated as able in goal setting and plan of care. [x]         Patient/family agree to work toward stated goals and plan of care. []         Patient understands intent and goals of therapy, but is neutral about his/her participation. []         Patient is unable to participate in goal setting/plan of care: ongoing with therapy staff.  []         Other:     Thank you for this referral.  Mali Katz DPT   Time Calculation: 24 mins      Eval Complexity: History: MEDIUM  Complexity : 1-2 comorbidities / personal factors will impact the outcome/ POC Exam:MEDIUM Complexity : 3 Standardized tests and measures addressing body structure, function, activity limitation and / or participation in recreation  Presentation: MEDIUM Complexity : Evolving with changing characteristics  Clinical Decision Making:Medium Complexity strength, bed mobility, transfers, gait, activity tolerance  Overall Complexity:MEDIUM

## 2020-06-19 NOTE — PROGRESS NOTES
Problem: Falls - Risk of  Goal: *Absence of Falls  Description: Document David Talavera Fall Risk and appropriate interventions in the flowsheet. Outcome: Progressing Towards Goal  Note: Fall Risk Interventions:  Mobility Interventions: Communicate number of staff needed for ambulation/transfer, Patient to call before getting OOB, Utilize walker, cane, or other assistive device         Medication Interventions: Assess postural VS orthostatic hypotension, Patient to call before getting OOB, Teach patient to arise slowly    Elimination Interventions: Call light in reach, Patient to call for help with toileting needs              Problem: Patient Education: Go to Patient Education Activity  Goal: Patient/Family Education  Outcome: Progressing Towards Goal     Problem: Pressure Injury - Risk of  Goal: *Prevention of pressure injury  Description: Document Brendan Scale and appropriate interventions in the flowsheet.   Outcome: Progressing Towards Goal  Note: Pressure Injury Interventions:  Sensory Interventions: Assess changes in LOC, Keep linens dry and wrinkle-free, Minimize linen layers    Moisture Interventions: Absorbent underpads, Minimize layers, Maintain skin hydration (lotion/cream)    Activity Interventions: PT/OT evaluation, Pressure redistribution bed/mattress(bed type), Increase time out of bed    Mobility Interventions: Assess need for specialty bed, PT/OT evaluation, Pressure redistribution bed/mattress (bed type)    Nutrition Interventions: Document food/fluid/supplement intake, Offer support with meals,snacks and hydration    Friction and Shear Interventions: Apply protective barrier, creams and emollients                Problem: Patient Education: Go to Patient Education Activity  Goal: Patient/Family Education  Outcome: Progressing Towards Goal     Problem: Patient Education: Go to Patient Education Activity  Goal: Patient/Family Education  Outcome: Progressing Towards Goal     Problem: Patient Education: [employer] to Patient Education Activity  Goal: Patient/Family Education  Outcome: Progressing Towards Goal     Problem: Airway Clearance - Ineffective  Goal: Achieve or maintain patent airway  Outcome: Progressing Towards Goal     Problem: Gas Exchange - Impaired  Goal: Absence of hypoxia  Outcome: Progressing Towards Goal  Goal: Promote optimal lung function  Outcome: Progressing Towards Goal     Problem: Breathing Pattern - Ineffective  Goal: Ability to achieve and maintain a regular respiratory rate  Outcome: Progressing Towards Goal     Problem: Isolation Precautions - Risk of Spread of Infection  Goal: Prevent transmission of infectious organism to others  Outcome: Progressing Towards Goal     Problem: Risk for Fluid Volume Deficit  Goal: Maintain normal heart rhythm  Outcome: Progressing Towards Goal  Goal: Maintain absence of muscle cramping  Outcome: Progressing Towards Goal  Goal: Maintain normal serum potassium, sodium, calcium, phosphorus, and pH  Outcome: Progressing Towards Goal     Problem: Loneliness or Risk for Loneliness  Goal: Demonstrate positive use of time alone when socialization is not possible  Outcome: Progressing Towards Goal     Problem: Patient Education: Go to Patient Education Activity  Goal: Patient/Family Education  Outcome: Progressing Towards Goal     Problem: Patient Education: Go to Patient Education Activity  Goal: Patient/Family Education  Outcome: Progressing Towards Goal

## 2020-06-19 NOTE — PROGRESS NOTES
Shift progress Summary:  Assumed care of patient  In bed awake and alert, no c/o pain, no s/s of distress. Uneventful night Remains on nasal cannula, VSS, call bell within reach.   Patient Vitals for the past 12 hrs:   Temp Pulse Resp BP SpO2   06/19/20 0415 97.5 °F (36.4 °C) (!) 59 18 173/85 100 %   06/19/20 0000 98.5 °F (36.9 °C) (!) 56 16 164/88 99 %   06/18/20 2008 97 °F (36.1 °C) (!) 58 18 157/85 100 %

## 2020-06-19 NOTE — ROUTINE PROCESS
Bedside shift change report given to Hank Curiel RN (oncoming nurse) by Brijesh Bah RN (offgoing nurse). Report included the following information SBAR, Kardex, Intake/Output, MAR, Recent Results and Med Rec Status.

## 2020-06-19 NOTE — ROUTINE PROCESS
Bedside and Verbal shift change report given to [address] (oncoming nurse) by Venancio Philippe RN (offgoing nurse). Report included the following information SBAR, Kardex, Intake/Output, MAR and Recent Results.

## 2020-06-19 NOTE — PROGRESS NOTES
[facility] Hospitalist Group  Progress Note    Patient: Jeanette Thao Age: 80 y.o. : 1935 MR#: [id number] SSN: [id number]  Date: 2020    Subjective/24-hour events:     No new complaints - respiratory status has continued to remain stable. Denies chest pain and SOB. Wants to go home. Assessment:   COPD with acute exacerbation   Chronic hypoxic respiratory failure on home oxygen  Acute renal insufficiency, improved  Hypertension  Dyslipidemia  Gout  BPH  Advanced age  COVID-23 negative    Plan:  Home today with [facility] to complete course of therapy and short prednisone taper. [place] [address] services ordered. Case discussed with:  [x]Patient  []Family  [x]Nursing  [x]Case Management  DVT Prophylaxis:  []Lovenox  [x]Hep SQ  []SCDs  []Coumadin   []On Heparin gtt    Objective:   VS:   Visit Vitals  /79 (BP 1 Location: Right arm, BP Patient Position: At rest)   Pulse 62   Temp 98.8 °F (37.1 °C)   Resp 18   Ht 5' 9\" (1.753 m)   Wt 89.4 kg (197 lb)   SpO2 100%   BMI 29.09 kg/m²      Tmax/24hrs: Temp (24hrs), Av.7 °F (36.5 °C), Min:96.7 °F (35.9 °C), Max:98.8 °F (37.1 °C)      Intake/Output Summary (Last 24 hours) at 2020 0918  Last data filed at 2020 0520  Gross per 24 hour   Intake --   Output 350 ml   Net -350 ml       General:  In NAD. Cardiovascular:  RRR. Pulmonary:  Decreased BS throughout, no accessory muscle use. GI:  Abdomen soft, NTTP. Extremities:  Warm, no ischemia. Neuro:   Moves extremities pontaneously, motor nonfocal.    Labs:    Recent Results (from the past 24 hour(s))   FIBRINOGEN    Collection Time: 20  2:54 AM   Result Value Ref Range    Fibrinogen 291 210 - 451 mg/dL       Signed By: Drew Aviles MD     2020

## 2020-06-23 LAB
BACTERIA SPEC CULT: NORMAL
BACTERIA SPEC CULT: NORMAL
SERVICE CMNT-IMP: NORMAL
SERVICE CMNT-IMP: NORMAL

## 2020-07-12 NOTE — DISCHARGE SUMMARY
Discharge Summary    Patient: Bhargavi Bravo MRN: [id number]  CSN: [id number]    YOB: 1935  Age: 80 y.o. Sex: male    DOA: 6/16/2020 LOS:  LOS: 3 days   Discharge Date: 6/19/2020     Admission Diagnoses: Suspected COVID-19 virus infection [Z20.828]  Tachypnea [R06.82]  Dyspnea [R06.00]  COPD exacerbation ([address] [J44.1]    Discharge Diagnoses:    COPD with acute exacerbation   Chronic hypoxic respiratory failure on home oxygen  Acute renal insufficiency, improved  Hypertension  Dyslipidemia  Gout  BPH  Advanced age  [phone] negative      Discharge Condition: Stable    PHYSICAL EXAM  Visit Vitals  /73 (BP 1 Location: Left arm, BP Patient Position: At rest)   Pulse 60   Temp 97.5 °F (36.4 °C)   Resp 18   Ht 5' 9\" (1.753 m)   Wt 89.4 kg (197 lb)   SpO2 100%   BMI 29.09 kg/m²       General: In NAD. Nontoxic-appearing. HEENT: NCAT. Sclerae anicteric, EOMI. OP clear. Lungs:  Breath sounds decreased throughout but clear, no wheezes. No accessory muscle use. Heart:  RRR. Abdomen: Soft, NT/ND. Extremities: Warm, no edema or ischemia. Psych:   Mood normal.  Neurologic:  Awake and alert, moves extremities spontaneously. Motor grossly nonfocal.    Hospital Course:   See admission H&P for full details of HPI. Patient was admitted to the hospital after presenting to the emergency department with complaints of shortness of breath and tachypnea. Patient has a known history of COPD but there was concern for possible COVID-19 infection. Novel coronavirus testing was done and found to be negative. Patient was treated with usual therapy for management of COPD exacerbation and he is continued to improve. Shortness of breath has improved significantly and patient is maintaining oxygen status on usual levels of oxygen. He has met maximum benefit of hospitalization and is medically stable for discharge home with outpatient follow-up as advised.       Consults:   None    Significant Diagnostic Studies:  CTA chest:  IMPRESSION:  1. No evidence of pulmonary metastatic dissection.     2.  7 mm groundglass pulmonary nodule with additional right upper lobe pulmonary  nodules. Consider short-term follow-up to document stability in 3 months.     3. COPD changes.     4.  Large hiatal hernia.     5.  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 CXR:  IMPRESSION:      Negative chest.      Discharge Medications:     Discharge Medication List as of 6/19/2020 12:00 PM      START taking these medications    Details   levoFLOXacin (LEVAQUIN) 750 mg tablet Take 1 Tab by mouth Daily (before breakfast). , Normal, Disp-1 Tab, R-0      predniSONE (DELTASONE) 10 mg tablet Take 4 tabs PO daily x 3 days, then 3 tabs PO daily x 3 days, then 2 tabs PO daily x 2 days, then 1 tab PO x 1 day., Normal, Disp-25 Tab, R-0         CONTINUE these medications which have NOT CHANGED    Details   tamsulosin (FLOMAX) 0.4 mg capsule Take 1 Cap by mouth daily. , Normal, Disp-90 Cap, R-3      dutasteride (AVODART) 0.5 mg capsule Take 1 Cap by mouth daily. , Normal, Disp-90 Cap, R-3      metoprolol tartrate (LOPRESSOR) 25 mg tablet Take 25 mg by mouth two (2) times a day., Historical Med      metFORMIN (GLUCOPHAGE) 500 mg tablet Take  by mouth two (2) times daily (with meals). , Historical Med      allopurinol (ZYLOPRIM) 100 mg tablet Take 100 mg by mouth daily. , Historical Med      folic acid (FOLVITE) 1 mg tablet Take  by mouth daily. , Historical Med      docusate sodium (COLACE) 100 mg capsule Take 100 mg by mouth two (2) times a day.  , Historical Med      amLODIPine (NORVASC) 5 mg tablet Take 5 mg by mouth daily. , Historical Med      gabapentin (NEURONTIN) 100 mg capsule Take  by mouth three (3) times daily. , Historical Med      simvastatin (ZOCOR) 20 mg tablet Take  by mouth nightly., Historical Med      valsartan-hydrochlorothiazide (DIOVAN HCT) 160-12.5 mg per tablet Take 1 Tab by mouth daily. , Historical Med      fish oil-dha-epa (FISH OIL) 1,200-144-216 mg Cap Take  by mouth., Historical Med      Magnesium Oxide 500 mg Cap Take  by mouth., Historical Med      risperidone (RISPERDAL) 3 mg tablet Take  by mouth., Historical Med      hydrocodone-acetaminophen (VICODIN ES) 7.5-750 mg per tablet Take  by mouth every six (6) hours as needed., Historical Med                 Activity: As tolerated. Diet: Cardiac Diet    Disposition: Home with [address]. Follow-up: with PCP in 1 week. Shyanne Layne.  Alanis Duran MD